# Patient Record
Sex: FEMALE | Race: WHITE | NOT HISPANIC OR LATINO
[De-identification: names, ages, dates, MRNs, and addresses within clinical notes are randomized per-mention and may not be internally consistent; named-entity substitution may affect disease eponyms.]

---

## 2018-04-22 PROBLEM — Z00.00 ENCOUNTER FOR PREVENTIVE HEALTH EXAMINATION: Status: ACTIVE | Noted: 2018-04-22

## 2018-05-22 ENCOUNTER — CLINICAL ADVICE (OUTPATIENT)
Age: 78
End: 2018-05-22

## 2018-05-22 ENCOUNTER — NON-APPOINTMENT (OUTPATIENT)
Age: 78
End: 2018-05-22

## 2018-05-22 ENCOUNTER — APPOINTMENT (OUTPATIENT)
Dept: CARDIOLOGY | Facility: CLINIC | Age: 78
End: 2018-05-22
Payer: COMMERCIAL

## 2018-05-22 VITALS
HEART RATE: 69 BPM | DIASTOLIC BLOOD PRESSURE: 87 MMHG | HEIGHT: 61 IN | SYSTOLIC BLOOD PRESSURE: 165 MMHG | OXYGEN SATURATION: 95 % | WEIGHT: 145 LBS | BODY MASS INDEX: 27.38 KG/M2

## 2018-05-22 DIAGNOSIS — Z82.49 FAMILY HISTORY OF ISCHEMIC HEART DISEASE AND OTHER DISEASES OF THE CIRCULATORY SYSTEM: ICD-10-CM

## 2018-05-22 DIAGNOSIS — E78.00 PURE HYPERCHOLESTEROLEMIA, UNSPECIFIED: ICD-10-CM

## 2018-05-22 DIAGNOSIS — I10 ESSENTIAL (PRIMARY) HYPERTENSION: ICD-10-CM

## 2018-05-22 DIAGNOSIS — F17.200 NICOTINE DEPENDENCE, UNSPECIFIED, UNCOMPLICATED: ICD-10-CM

## 2018-05-22 DIAGNOSIS — Z78.9 OTHER SPECIFIED HEALTH STATUS: ICD-10-CM

## 2018-05-22 PROCEDURE — 99205 OFFICE O/P NEW HI 60 MIN: CPT

## 2018-05-22 PROCEDURE — 93000 ELECTROCARDIOGRAM COMPLETE: CPT

## 2018-05-22 RX ORDER — AMLODIPINE BESYLATE 5 MG/1
5 TABLET ORAL
Refills: 0 | Status: ACTIVE | COMMUNITY

## 2018-05-22 RX ORDER — METFORMIN HYDROCHLORIDE 500 MG/1
500 TABLET, COATED ORAL
Qty: 60 | Refills: 2 | Status: ACTIVE | COMMUNITY

## 2018-05-22 RX ORDER — ATORVASTATIN CALCIUM 20 MG/1
20 TABLET, FILM COATED ORAL
Refills: 0 | Status: ACTIVE | COMMUNITY

## 2018-05-22 RX ORDER — FLUTICASONE PROPIONATE AND SALMETEROL 50; 500 UG/1; UG/1
POWDER RESPIRATORY (INHALATION)
Refills: 0 | Status: ACTIVE | COMMUNITY

## 2018-05-22 RX ORDER — GLIMEPIRIDE 1 MG/1
1 TABLET ORAL
Refills: 0 | Status: ACTIVE | COMMUNITY

## 2018-05-22 RX ORDER — LOSARTAN POTASSIUM 100 MG/1
100 TABLET, FILM COATED ORAL
Refills: 0 | Status: ACTIVE | COMMUNITY

## 2018-06-05 ENCOUNTER — APPOINTMENT (OUTPATIENT)
Dept: CARDIOLOGY | Facility: CLINIC | Age: 78
End: 2018-06-05
Payer: COMMERCIAL

## 2018-06-05 PROCEDURE — 93880 EXTRACRANIAL BILAT STUDY: CPT

## 2018-06-14 ENCOUNTER — APPOINTMENT (OUTPATIENT)
Dept: CARDIOLOGY | Facility: CLINIC | Age: 78
End: 2018-06-14
Payer: COMMERCIAL

## 2018-06-14 PROCEDURE — 93306 TTE W/DOPPLER COMPLETE: CPT

## 2018-06-18 ENCOUNTER — APPOINTMENT (OUTPATIENT)
Dept: CARDIOLOGY | Facility: CLINIC | Age: 78
End: 2018-06-18
Payer: MEDICARE

## 2018-06-18 PROCEDURE — 78452 HT MUSCLE IMAGE SPECT MULT: CPT

## 2018-06-18 PROCEDURE — 93015 CV STRESS TEST SUPVJ I&R: CPT

## 2018-06-18 PROCEDURE — A9500: CPT

## 2021-10-06 PROBLEM — I10 ESSENTIAL HYPERTENSION: Status: ACTIVE | Noted: 2018-05-22

## 2022-02-01 ENCOUNTER — APPOINTMENT (OUTPATIENT)
Dept: OTOLARYNGOLOGY | Facility: CLINIC | Age: 82
End: 2022-02-01
Payer: MEDICARE

## 2022-02-01 VITALS
HEIGHT: 61 IN | BODY MASS INDEX: 25.49 KG/M2 | DIASTOLIC BLOOD PRESSURE: 86 MMHG | HEART RATE: 92 BPM | SYSTOLIC BLOOD PRESSURE: 152 MMHG | TEMPERATURE: 97.3 F | WEIGHT: 135 LBS

## 2022-02-01 DIAGNOSIS — H90.3 SENSORINEURAL HEARING LOSS, BILATERAL: ICD-10-CM

## 2022-02-01 DIAGNOSIS — H61.23 IMPACTED CERUMEN, BILATERAL: ICD-10-CM

## 2022-02-01 PROCEDURE — 99203 OFFICE O/P NEW LOW 30 MIN: CPT | Mod: 25

## 2022-02-01 PROCEDURE — 92567 TYMPANOMETRY: CPT

## 2022-02-01 PROCEDURE — 92557 COMPREHENSIVE HEARING TEST: CPT

## 2022-02-01 NOTE — HISTORY OF PRESENT ILLNESS
[de-identified] : c/o problems with balance.  Problems with legs and fingers are numb.  C/o problem lifting legs.  Did go to neurology.   No hx of chronic ear problems.   No blackout or diplopia.  Does occ feel like falling.

## 2022-02-01 NOTE — ASSESSMENT
[FreeTextEntry1] : Patient with multiple complaints with difficulty with balance , feeling weak, tingling in fingers and clogged ears.  Exam unremarkable except for bilat impacted cerumen .  Patient does have bilat symmetric snhl and discussed HAE but patient not interested.  Feel problem with issues may be ? diabetic neuropathy as patient states she has hard to control diabetes.  Recommended return to endocrinology and neurology.   Follow up as necessary .

## 2022-02-01 NOTE — DATA REVIEWED
[de-identified] : Mild to moderate SNHL 7816-4782 Hz\par Type A tymps\par Pt not interested in HAs [de-identified] : reviewed MRI of 5/21/2021 - neg MRI of head

## 2022-02-01 NOTE — PHYSICAL EXAM
[Midline] : trachea located in midline position [Normal] : no rashes [de-identified] : bilat impacted cerumen  [de-identified] : after cerumen removal

## 2022-02-01 NOTE — REVIEW OF SYSTEMS
[Dizziness] : dizziness [Vertigo] : vertigo [Lightheadedness] : lightheadedness [Ear Drainage] : ear drainage [Negative] : Heme/Lymph [de-identified] : occasional ringing in the ear, clogged ears

## 2022-04-11 ENCOUNTER — NON-APPOINTMENT (OUTPATIENT)
Age: 82
End: 2022-04-11

## 2022-04-11 ENCOUNTER — APPOINTMENT (OUTPATIENT)
Dept: ORTHOPEDIC SURGERY | Facility: CLINIC | Age: 82
End: 2022-04-11
Payer: MEDICARE

## 2022-04-11 VITALS
HEIGHT: 61 IN | HEART RATE: 82 BPM | SYSTOLIC BLOOD PRESSURE: 147 MMHG | DIASTOLIC BLOOD PRESSURE: 74 MMHG | WEIGHT: 138 LBS | BODY MASS INDEX: 26.06 KG/M2

## 2022-04-11 DIAGNOSIS — Z86.79 PERSONAL HISTORY OF OTHER DISEASES OF THE CIRCULATORY SYSTEM: ICD-10-CM

## 2022-04-11 DIAGNOSIS — Z87.09 PERSONAL HISTORY OF OTHER DISEASES OF THE RESPIRATORY SYSTEM: ICD-10-CM

## 2022-04-11 DIAGNOSIS — Z87.39 PERSONAL HISTORY OF OTHER DISEASES OF THE MUSCULOSKELETAL SYSTEM AND CONNECTIVE TISSUE: ICD-10-CM

## 2022-04-11 DIAGNOSIS — E11.9 TYPE 2 DIABETES MELLITUS W/OUT COMPLICATIONS: ICD-10-CM

## 2022-04-11 PROCEDURE — 99204 OFFICE O/P NEW MOD 45 MIN: CPT

## 2022-04-11 RX ORDER — MECLIZINE HYDROCHLORIDE 12.5 MG/1
12.5 TABLET ORAL
Qty: 90 | Refills: 0 | Status: ACTIVE | COMMUNITY
Start: 2022-01-27

## 2022-04-11 RX ORDER — OMEPRAZOLE 20 MG/1
20 CAPSULE, DELAYED RELEASE ORAL
Qty: 90 | Refills: 0 | Status: ACTIVE | COMMUNITY
Start: 2021-08-12

## 2022-04-11 RX ORDER — GLIMEPIRIDE 4 MG/1
4 TABLET ORAL
Qty: 90 | Refills: 0 | Status: ACTIVE | COMMUNITY
Start: 2022-02-25

## 2022-04-25 ENCOUNTER — APPOINTMENT (OUTPATIENT)
Dept: ORTHOPEDIC SURGERY | Facility: CLINIC | Age: 82
End: 2022-04-25
Payer: MEDICARE

## 2022-04-25 VITALS
SYSTOLIC BLOOD PRESSURE: 162 MMHG | HEART RATE: 62 BPM | WEIGHT: 137.3 LBS | HEIGHT: 61 IN | DIASTOLIC BLOOD PRESSURE: 72 MMHG | BODY MASS INDEX: 25.92 KG/M2

## 2022-04-25 DIAGNOSIS — M48.02 SPINAL STENOSIS, CERVICAL REGION: ICD-10-CM

## 2022-04-25 PROCEDURE — 99214 OFFICE O/P EST MOD 30 MIN: CPT

## 2022-04-26 ENCOUNTER — NON-APPOINTMENT (OUTPATIENT)
Age: 82
End: 2022-04-26

## 2022-05-18 ENCOUNTER — OUTPATIENT (OUTPATIENT)
Dept: OUTPATIENT SERVICES | Facility: HOSPITAL | Age: 82
LOS: 1 days | End: 2022-05-18
Payer: MEDICARE

## 2022-05-18 VITALS
HEART RATE: 66 BPM | DIASTOLIC BLOOD PRESSURE: 64 MMHG | OXYGEN SATURATION: 98 % | TEMPERATURE: 98 F | RESPIRATION RATE: 16 BRPM | SYSTOLIC BLOOD PRESSURE: 161 MMHG | HEIGHT: 60 IN | WEIGHT: 138.01 LBS

## 2022-05-18 DIAGNOSIS — M48.02 SPINAL STENOSIS, CERVICAL REGION: ICD-10-CM

## 2022-05-18 DIAGNOSIS — Z98.890 OTHER SPECIFIED POSTPROCEDURAL STATES: Chronic | ICD-10-CM

## 2022-05-18 DIAGNOSIS — G95.9 DISEASE OF SPINAL CORD, UNSPECIFIED: ICD-10-CM

## 2022-05-18 DIAGNOSIS — M54.12 RADICULOPATHY, CERVICAL REGION: ICD-10-CM

## 2022-05-18 DIAGNOSIS — Z01.818 ENCOUNTER FOR OTHER PREPROCEDURAL EXAMINATION: ICD-10-CM

## 2022-05-18 LAB
A1C WITH ESTIMATED AVERAGE GLUCOSE RESULT: 8.4 % — HIGH (ref 4–5.6)
ALBUMIN SERPL ELPH-MCNC: 4.2 G/DL — SIGNIFICANT CHANGE UP (ref 3.3–5)
ALP SERPL-CCNC: 75 U/L — SIGNIFICANT CHANGE UP (ref 30–120)
ALT FLD-CCNC: 21 U/L DA — SIGNIFICANT CHANGE UP (ref 10–60)
ANION GAP SERPL CALC-SCNC: 9 MMOL/L — SIGNIFICANT CHANGE UP (ref 5–17)
APTT BLD: 30.7 SEC — SIGNIFICANT CHANGE UP (ref 27.5–35.5)
AST SERPL-CCNC: 19 U/L — SIGNIFICANT CHANGE UP (ref 10–40)
BILIRUB SERPL-MCNC: 0.5 MG/DL — SIGNIFICANT CHANGE UP (ref 0.2–1.2)
BLD GP AB SCN SERPL QL: SIGNIFICANT CHANGE UP
BUN SERPL-MCNC: 17 MG/DL — SIGNIFICANT CHANGE UP (ref 7–23)
CALCIUM SERPL-MCNC: 9.7 MG/DL — SIGNIFICANT CHANGE UP (ref 8.4–10.5)
CHLORIDE SERPL-SCNC: 101 MMOL/L — SIGNIFICANT CHANGE UP (ref 96–108)
CO2 SERPL-SCNC: 30 MMOL/L — SIGNIFICANT CHANGE UP (ref 22–31)
CREAT SERPL-MCNC: 0.91 MG/DL — SIGNIFICANT CHANGE UP (ref 0.5–1.3)
EGFR: 64 ML/MIN/1.73M2 — SIGNIFICANT CHANGE UP
ESTIMATED AVERAGE GLUCOSE: 194 MG/DL — HIGH (ref 68–114)
GLUCOSE SERPL-MCNC: 209 MG/DL — HIGH (ref 70–99)
HCT VFR BLD CALC: 41.7 % — SIGNIFICANT CHANGE UP (ref 34.5–45)
HGB BLD-MCNC: 13.6 G/DL — SIGNIFICANT CHANGE UP (ref 11.5–15.5)
INR BLD: 0.99 RATIO — SIGNIFICANT CHANGE UP (ref 0.88–1.16)
MCHC RBC-ENTMCNC: 30.2 PG — SIGNIFICANT CHANGE UP (ref 27–34)
MCHC RBC-ENTMCNC: 32.6 GM/DL — SIGNIFICANT CHANGE UP (ref 32–36)
MCV RBC AUTO: 92.7 FL — SIGNIFICANT CHANGE UP (ref 80–100)
NRBC # BLD: 0 /100 WBCS — SIGNIFICANT CHANGE UP (ref 0–0)
PLATELET # BLD AUTO: 250 K/UL — SIGNIFICANT CHANGE UP (ref 150–400)
POTASSIUM SERPL-MCNC: 4.7 MMOL/L — SIGNIFICANT CHANGE UP (ref 3.5–5.3)
POTASSIUM SERPL-SCNC: 4.7 MMOL/L — SIGNIFICANT CHANGE UP (ref 3.5–5.3)
PROT SERPL-MCNC: 7.6 G/DL — SIGNIFICANT CHANGE UP (ref 6–8.3)
PROTHROM AB SERPL-ACNC: 11.7 SEC — SIGNIFICANT CHANGE UP (ref 10.5–13.4)
RBC # BLD: 4.5 M/UL — SIGNIFICANT CHANGE UP (ref 3.8–5.2)
RBC # FLD: 13.1 % — SIGNIFICANT CHANGE UP (ref 10.3–14.5)
SODIUM SERPL-SCNC: 140 MMOL/L — SIGNIFICANT CHANGE UP (ref 135–145)
WBC # BLD: 7.05 K/UL — SIGNIFICANT CHANGE UP (ref 3.8–10.5)
WBC # FLD AUTO: 7.05 K/UL — SIGNIFICANT CHANGE UP (ref 3.8–10.5)

## 2022-05-18 PROCEDURE — 93005 ELECTROCARDIOGRAM TRACING: CPT

## 2022-05-18 PROCEDURE — G0463: CPT

## 2022-05-18 PROCEDURE — 93010 ELECTROCARDIOGRAM REPORT: CPT

## 2022-05-18 RX ORDER — OMEPRAZOLE 10 MG/1
0 CAPSULE, DELAYED RELEASE ORAL
Qty: 0 | Refills: 1 | DISCHARGE

## 2022-05-18 RX ORDER — ATORVASTATIN CALCIUM 80 MG/1
0 TABLET, FILM COATED ORAL
Qty: 0 | Refills: 2 | DISCHARGE

## 2022-05-18 RX ORDER — DONEPEZIL HYDROCHLORIDE 10 MG/1
0 TABLET, FILM COATED ORAL
Qty: 0 | Refills: 0 | DISCHARGE

## 2022-05-18 RX ORDER — LOSARTAN POTASSIUM 100 MG/1
0 TABLET, FILM COATED ORAL
Qty: 0 | Refills: 5 | DISCHARGE

## 2022-05-18 RX ORDER — GLIMEPIRIDE 1 MG
0 TABLET ORAL
Qty: 0 | Refills: 0 | DISCHARGE

## 2022-05-18 RX ORDER — RIVASTIGMINE 4.6 MG/24H
0 PATCH, EXTENDED RELEASE TRANSDERMAL
Qty: 0 | Refills: 0 | DISCHARGE

## 2022-05-18 RX ORDER — AMLODIPINE BESYLATE 2.5 MG/1
0 TABLET ORAL
Qty: 0 | Refills: 2 | DISCHARGE

## 2022-05-18 RX ORDER — METFORMIN HYDROCHLORIDE 850 MG/1
0 TABLET ORAL
Qty: 0 | Refills: 3 | DISCHARGE

## 2022-05-18 NOTE — H&P PST ADULT - NSICDXPASTSURGICALHX_GEN_ALL_CORE_FT
PAST SURGICAL HISTORY:  History of bladder surgery     S/P carpal tunnel release right    
Detail Level: Detailed
Plan: Pt to see OBGYN if becomes sexually active.
Continue Regimen: Rekha QD\\nGentle face wash BID\\nmoisturizer with sunscreen
Initiate Treatment: Tretinoin 0.025% to face QOHS x 1wk then increase to nightly as tolerated

## 2022-05-18 NOTE — H&P PST ADULT - ATTENDING COMMENTS
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  She is also a 1/2 pack/day smoker and a poorly controlled diabetic.  The need for strict control of those two disease processes is paramount to a good post surgical outcome.  Her blood sugar today is less than 170.  Will proceed with this needed surgery but the importance of compliance could not be underscored more during my preop talk in the holding area prior to her neck surgery.

## 2022-05-18 NOTE — H&P PST ADULT - ASSESSMENT
81 y/o female with lower back pain  Planned surgery.- posterior decompressive laminectomy  Will obtain medical clearance  covid testing 6/5/22-8-2pm  Pre op instructions provided  Instructions provided on medications to continue and to take the day morning of surgery

## 2022-05-18 NOTE — H&P PST ADULT - NSANTHOSAYNRD_GEN_A_CORE
No. RUTH ANN screening performed.  STOP BANG Legend: 0-2 = LOW Risk; 3-4 = INTERMEDIATE Risk; 5-8 = HIGH Risk

## 2022-05-18 NOTE — H&P PST ADULT - NSICDXPASTMEDICALHX_GEN_ALL_CORE_FT
PAST MEDICAL HISTORY:  Dementia     H/O spinal stenosis     HLD (hyperlipidemia)     Hypertension     Type 2 diabetes mellitus

## 2022-05-18 NOTE — H&P PST ADULT - HISTORY OF PRESENT ILLNESS
82 y/o female with h/o  80 y/o female with h/o lower back pain for last 5 months. Decreased ROM and pain with flexion. Denies any injury. Not taking any pain meds. MRI done and was advised surgery. Seen in PAT in NAD

## 2022-05-18 NOTE — H&P PST ADULT - NSICDXFAMILYHX_GEN_ALL_CORE_FT
FAMILY HISTORY:  Child  Still living? No  Family history of heart attack, Age at diagnosis: Age Unknown

## 2022-05-31 RX ORDER — TRANEXAMIC ACID 100 MG/ML
938 INJECTION, SOLUTION INTRAVENOUS ONCE
Refills: 0 | Status: DISCONTINUED | OUTPATIENT
Start: 2022-06-07 | End: 2022-06-08

## 2022-06-01 NOTE — PROVIDER CONTACT NOTE (OTHER) - ASSESSMENT
The Spine Pre-Operative Education packet was given to the patient on 4/26/22. The patient and NP reviewed the information included in the packet. All her questions were answered and she gave a clear understanding of the instructions. She was advised to call the office at any time with further questions or concerns.

## 2022-06-05 ENCOUNTER — OUTPATIENT (OUTPATIENT)
Dept: OUTPATIENT SERVICES | Facility: HOSPITAL | Age: 82
LOS: 1 days | End: 2022-06-05
Payer: MEDICARE

## 2022-06-05 DIAGNOSIS — Z98.890 OTHER SPECIFIED POSTPROCEDURAL STATES: Chronic | ICD-10-CM

## 2022-06-05 DIAGNOSIS — Z20.828 CONTACT WITH AND (SUSPECTED) EXPOSURE TO OTHER VIRAL COMMUNICABLE DISEASES: ICD-10-CM

## 2022-06-05 LAB — SARS-COV-2 RNA SPEC QL NAA+PROBE: SIGNIFICANT CHANGE UP

## 2022-06-05 PROCEDURE — U0005: CPT

## 2022-06-05 PROCEDURE — U0003: CPT

## 2022-06-06 ENCOUNTER — TRANSCRIPTION ENCOUNTER (OUTPATIENT)
Age: 82
End: 2022-06-06

## 2022-06-07 ENCOUNTER — RESULT REVIEW (OUTPATIENT)
Age: 82
End: 2022-06-07

## 2022-06-07 ENCOUNTER — INPATIENT (INPATIENT)
Facility: HOSPITAL | Age: 82
LOS: 0 days | Discharge: HOME | DRG: 472 | End: 2022-06-08
Attending: ORTHOPAEDIC SURGERY | Admitting: ORTHOPAEDIC SURGERY
Payer: MEDICARE

## 2022-06-07 ENCOUNTER — TRANSCRIPTION ENCOUNTER (OUTPATIENT)
Age: 82
End: 2022-06-07

## 2022-06-07 ENCOUNTER — APPOINTMENT (OUTPATIENT)
Dept: ORTHOPEDIC SURGERY | Facility: HOSPITAL | Age: 82
End: 2022-06-07

## 2022-06-07 VITALS
HEART RATE: 74 BPM | HEIGHT: 55 IN | WEIGHT: 136.69 LBS | SYSTOLIC BLOOD PRESSURE: 153 MMHG | DIASTOLIC BLOOD PRESSURE: 65 MMHG | RESPIRATION RATE: 24 BRPM | OXYGEN SATURATION: 98 % | TEMPERATURE: 98 F

## 2022-06-07 DIAGNOSIS — Z98.890 OTHER SPECIFIED POSTPROCEDURAL STATES: Chronic | ICD-10-CM

## 2022-06-07 DIAGNOSIS — G95.9 DISEASE OF SPINAL CORD, UNSPECIFIED: ICD-10-CM

## 2022-06-07 DIAGNOSIS — M48.02 SPINAL STENOSIS, CERVICAL REGION: ICD-10-CM

## 2022-06-07 PROBLEM — I10 ESSENTIAL (PRIMARY) HYPERTENSION: Chronic | Status: ACTIVE | Noted: 2022-05-18

## 2022-06-07 PROBLEM — E78.5 HYPERLIPIDEMIA, UNSPECIFIED: Chronic | Status: ACTIVE | Noted: 2022-05-18

## 2022-06-07 PROBLEM — Z87.39 PERSONAL HISTORY OF OTHER DISEASES OF THE MUSCULOSKELETAL SYSTEM AND CONNECTIVE TISSUE: Chronic | Status: ACTIVE | Noted: 2022-05-18

## 2022-06-07 PROBLEM — E11.9 TYPE 2 DIABETES MELLITUS WITHOUT COMPLICATIONS: Chronic | Status: ACTIVE | Noted: 2022-05-18

## 2022-06-07 PROBLEM — F03.90 UNSPECIFIED DEMENTIA WITHOUT BEHAVIORAL DISTURBANCE: Chronic | Status: ACTIVE | Noted: 2022-05-18

## 2022-06-07 LAB
ABO RH CONFIRMATION: SIGNIFICANT CHANGE UP
GLUCOSE BLDC GLUCOMTR-MCNC: 162 MG/DL — HIGH (ref 70–99)
GLUCOSE BLDC GLUCOMTR-MCNC: 210 MG/DL — HIGH (ref 70–99)
GLUCOSE BLDC GLUCOMTR-MCNC: 252 MG/DL — HIGH (ref 70–99)

## 2022-06-07 PROCEDURE — 63048 LAM FACETEC &FORAMOT EA ADDL: CPT | Mod: 82

## 2022-06-07 PROCEDURE — 63045 LAM FACETEC & FORAMOT CRV: CPT | Mod: 82

## 2022-06-07 PROCEDURE — 22842 INSERT SPINE FIXATION DEVICE: CPT | Mod: 82

## 2022-06-07 PROCEDURE — 88304 TISSUE EXAM BY PATHOLOGIST: CPT | Mod: 26

## 2022-06-07 PROCEDURE — 63048 LAM FACETEC &FORAMOT EA ADDL: CPT

## 2022-06-07 PROCEDURE — 22842 INSERT SPINE FIXATION DEVICE: CPT

## 2022-06-07 PROCEDURE — 88311 DECALCIFY TISSUE: CPT | Mod: 26

## 2022-06-07 PROCEDURE — 22614 ARTHRD PST TQ 1NTRSPC EA ADD: CPT

## 2022-06-07 PROCEDURE — 22600 ARTHRD PST TQ 1NTRSPC CRV: CPT

## 2022-06-07 PROCEDURE — 63045 LAM FACETEC & FORAMOT CRV: CPT

## 2022-06-07 PROCEDURE — 22614 ARTHRD PST TQ 1NTRSPC EA ADD: CPT | Mod: 82

## 2022-06-07 PROCEDURE — 22600 ARTHRD PST TQ 1NTRSPC CRV: CPT | Mod: 82

## 2022-06-07 PROCEDURE — 99223 1ST HOSP IP/OBS HIGH 75: CPT

## 2022-06-07 DEVICE — SEALANT VISTASEAL FIBRIN HUMAN 4ML: Type: IMPLANTABLE DEVICE | Status: FUNCTIONAL

## 2022-06-07 DEVICE — IMPLANTABLE DEVICE: Type: IMPLANTABLE DEVICE | Status: FUNCTIONAL

## 2022-06-07 DEVICE — BONE WAX 2.5GM: Type: IMPLANTABLE DEVICE | Status: FUNCTIONAL

## 2022-06-07 DEVICE — ALLOGRAFT INTEGRO DBM PLUS 10CC STRL: Type: IMPLANTABLE DEVICE | Status: FUNCTIONAL

## 2022-06-07 DEVICE — KIT SURGIFLO HEMOSTATIC MATRIX: Type: IMPLANTABLE DEVICE | Status: FUNCTIONAL

## 2022-06-07 DEVICE — SURGIFOAM PAD SZ 100: Type: IMPLANTABLE DEVICE | Status: FUNCTIONAL

## 2022-06-07 RX ORDER — DEXTROSE 50 % IN WATER 50 %
15 SYRINGE (ML) INTRAVENOUS ONCE
Refills: 0 | Status: DISCONTINUED | OUTPATIENT
Start: 2022-06-07 | End: 2022-06-08

## 2022-06-07 RX ORDER — INSULIN LISPRO 100/ML
VIAL (ML) SUBCUTANEOUS
Refills: 0 | Status: DISCONTINUED | OUTPATIENT
Start: 2022-06-07 | End: 2022-06-08

## 2022-06-07 RX ORDER — HYDROMORPHONE HYDROCHLORIDE 2 MG/ML
0.5 INJECTION INTRAMUSCULAR; INTRAVENOUS; SUBCUTANEOUS
Refills: 0 | Status: DISCONTINUED | OUTPATIENT
Start: 2022-06-07 | End: 2022-06-07

## 2022-06-07 RX ORDER — ACETAMINOPHEN 500 MG
1000 TABLET ORAL EVERY 8 HOURS
Refills: 0 | Status: DISCONTINUED | OUTPATIENT
Start: 2022-06-08 | End: 2022-06-08

## 2022-06-07 RX ORDER — DEXTROSE 50 % IN WATER 50 %
25 SYRINGE (ML) INTRAVENOUS ONCE
Refills: 0 | Status: DISCONTINUED | OUTPATIENT
Start: 2022-06-07 | End: 2022-06-08

## 2022-06-07 RX ORDER — PANTOPRAZOLE SODIUM 20 MG/1
40 TABLET, DELAYED RELEASE ORAL
Refills: 0 | Status: DISCONTINUED | OUTPATIENT
Start: 2022-06-07 | End: 2022-06-08

## 2022-06-07 RX ORDER — HYDROMORPHONE HYDROCHLORIDE 2 MG/ML
2 INJECTION INTRAMUSCULAR; INTRAVENOUS; SUBCUTANEOUS
Refills: 0 | Status: DISCONTINUED | OUTPATIENT
Start: 2022-06-07 | End: 2022-06-08

## 2022-06-07 RX ORDER — SODIUM CHLORIDE 9 MG/ML
1000 INJECTION, SOLUTION INTRAVENOUS
Refills: 0 | Status: DISCONTINUED | OUTPATIENT
Start: 2022-06-07 | End: 2022-06-07

## 2022-06-07 RX ORDER — ATORVASTATIN CALCIUM 80 MG/1
20 TABLET, FILM COATED ORAL AT BEDTIME
Refills: 0 | Status: DISCONTINUED | OUTPATIENT
Start: 2022-06-07 | End: 2022-06-08

## 2022-06-07 RX ORDER — HYDROMORPHONE HYDROCHLORIDE 2 MG/ML
4 INJECTION INTRAMUSCULAR; INTRAVENOUS; SUBCUTANEOUS
Refills: 0 | Status: DISCONTINUED | OUTPATIENT
Start: 2022-06-07 | End: 2022-06-08

## 2022-06-07 RX ORDER — CEFAZOLIN SODIUM 1 G
2000 VIAL (EA) INJECTION ONCE
Refills: 0 | Status: COMPLETED | OUTPATIENT
Start: 2022-06-07 | End: 2022-06-07

## 2022-06-07 RX ORDER — METFORMIN HYDROCHLORIDE 850 MG/1
500 TABLET ORAL
Refills: 0 | Status: DISCONTINUED | OUTPATIENT
Start: 2022-06-08 | End: 2022-06-08

## 2022-06-07 RX ORDER — SODIUM CHLORIDE 9 MG/ML
1000 INJECTION, SOLUTION INTRAVENOUS
Refills: 0 | Status: DISCONTINUED | OUTPATIENT
Start: 2022-06-07 | End: 2022-06-08

## 2022-06-07 RX ORDER — ACETAMINOPHEN 500 MG
1000 TABLET ORAL ONCE
Refills: 0 | Status: COMPLETED | OUTPATIENT
Start: 2022-06-07 | End: 2022-06-07

## 2022-06-07 RX ORDER — SENNA PLUS 8.6 MG/1
2 TABLET ORAL AT BEDTIME
Refills: 0 | Status: DISCONTINUED | OUTPATIENT
Start: 2022-06-07 | End: 2022-06-08

## 2022-06-07 RX ORDER — GLUCAGON INJECTION, SOLUTION 0.5 MG/.1ML
1 INJECTION, SOLUTION SUBCUTANEOUS ONCE
Refills: 0 | Status: DISCONTINUED | OUTPATIENT
Start: 2022-06-07 | End: 2022-06-08

## 2022-06-07 RX ORDER — LOSARTAN POTASSIUM 100 MG/1
100 TABLET, FILM COATED ORAL DAILY
Refills: 0 | Status: DISCONTINUED | OUTPATIENT
Start: 2022-06-09 | End: 2022-06-08

## 2022-06-07 RX ORDER — DONEPEZIL HYDROCHLORIDE 10 MG/1
5 TABLET, FILM COATED ORAL AT BEDTIME
Refills: 0 | Status: DISCONTINUED | OUTPATIENT
Start: 2022-06-07 | End: 2022-06-08

## 2022-06-07 RX ORDER — HYDROMORPHONE HYDROCHLORIDE 2 MG/ML
0.5 INJECTION INTRAMUSCULAR; INTRAVENOUS; SUBCUTANEOUS
Refills: 0 | Status: DISCONTINUED | OUTPATIENT
Start: 2022-06-07 | End: 2022-06-08

## 2022-06-07 RX ORDER — APREPITANT 80 MG/1
40 CAPSULE ORAL ONCE
Refills: 0 | Status: COMPLETED | OUTPATIENT
Start: 2022-06-07 | End: 2022-06-07

## 2022-06-07 RX ORDER — DEXTROSE 50 % IN WATER 50 %
12.5 SYRINGE (ML) INTRAVENOUS ONCE
Refills: 0 | Status: DISCONTINUED | OUTPATIENT
Start: 2022-06-07 | End: 2022-06-08

## 2022-06-07 RX ORDER — AMLODIPINE BESYLATE 2.5 MG/1
5 TABLET ORAL DAILY
Refills: 0 | Status: DISCONTINUED | OUTPATIENT
Start: 2022-06-09 | End: 2022-06-08

## 2022-06-07 RX ORDER — ONDANSETRON 8 MG/1
4 TABLET, FILM COATED ORAL ONCE
Refills: 0 | Status: COMPLETED | OUTPATIENT
Start: 2022-06-07 | End: 2022-06-07

## 2022-06-07 RX ORDER — CEFAZOLIN SODIUM 1 G
2000 VIAL (EA) INJECTION EVERY 8 HOURS
Refills: 0 | Status: COMPLETED | OUTPATIENT
Start: 2022-06-07 | End: 2022-06-08

## 2022-06-07 RX ORDER — POLYETHYLENE GLYCOL 3350 17 G/17G
17 POWDER, FOR SOLUTION ORAL AT BEDTIME
Refills: 0 | Status: DISCONTINUED | OUTPATIENT
Start: 2022-06-07 | End: 2022-06-08

## 2022-06-07 RX ORDER — ONDANSETRON 8 MG/1
4 TABLET, FILM COATED ORAL EVERY 6 HOURS
Refills: 0 | Status: DISCONTINUED | OUTPATIENT
Start: 2022-06-07 | End: 2022-06-08

## 2022-06-07 RX ADMIN — Medication 100 MILLIGRAM(S): at 21:00

## 2022-06-07 RX ADMIN — SENNA PLUS 2 TABLET(S): 8.6 TABLET ORAL at 21:26

## 2022-06-07 RX ADMIN — HYDROMORPHONE HYDROCHLORIDE 2 MILLIGRAM(S): 2 INJECTION INTRAMUSCULAR; INTRAVENOUS; SUBCUTANEOUS at 21:15

## 2022-06-07 RX ADMIN — HYDROMORPHONE HYDROCHLORIDE 2 MILLIGRAM(S): 2 INJECTION INTRAMUSCULAR; INTRAVENOUS; SUBCUTANEOUS at 21:45

## 2022-06-07 RX ADMIN — HYDROMORPHONE HYDROCHLORIDE 0.5 MILLIGRAM(S): 2 INJECTION INTRAMUSCULAR; INTRAVENOUS; SUBCUTANEOUS at 19:26

## 2022-06-07 RX ADMIN — APREPITANT 40 MILLIGRAM(S): 80 CAPSULE ORAL at 11:07

## 2022-06-07 RX ADMIN — Medication 1000 MILLIGRAM(S): at 22:07

## 2022-06-07 RX ADMIN — Medication 3: at 21:56

## 2022-06-07 RX ADMIN — ATORVASTATIN CALCIUM 20 MILLIGRAM(S): 80 TABLET, FILM COATED ORAL at 21:26

## 2022-06-07 RX ADMIN — HYDROMORPHONE HYDROCHLORIDE 0.5 MILLIGRAM(S): 2 INJECTION INTRAMUSCULAR; INTRAVENOUS; SUBCUTANEOUS at 18:52

## 2022-06-07 RX ADMIN — DONEPEZIL HYDROCHLORIDE 5 MILLIGRAM(S): 10 TABLET, FILM COATED ORAL at 21:26

## 2022-06-07 RX ADMIN — SODIUM CHLORIDE 75 MILLILITER(S): 9 INJECTION, SOLUTION INTRAVENOUS at 18:52

## 2022-06-07 RX ADMIN — ONDANSETRON 4 MILLIGRAM(S): 8 TABLET, FILM COATED ORAL at 19:08

## 2022-06-07 RX ADMIN — Medication 400 MILLIGRAM(S): at 21:55

## 2022-06-07 NOTE — BRIEF OPERATIVE NOTE - NSICDXBRIEFPOSTOP_GEN_ALL_CORE_FT
POST-OP DIAGNOSIS:  Myelopathy concurrent with and due to spinal stenosis of cervical region 07-Jun-2022 17:10:42  Jorje Bolton

## 2022-06-07 NOTE — BRIEF OPERATIVE NOTE - NSICDXBRIEFPREOP_GEN_ALL_CORE_FT
PRE-OP DIAGNOSIS:  Myelopathy concurrent with and due to spinal stenosis of cervical region 07-Jun-2022 17:10:25  Jorje Bolton

## 2022-06-07 NOTE — PHYSICAL THERAPY INITIAL EVALUATION ADULT - PERTINENT HX OF CURRENT PROBLEM, REHAB EVAL
82 y/o female with h/o lower back pain for last 5 months. Decreased ROM and pain with flexion. Denies any injury. Not taking any pain meds. MRI done and was advised surgery. Seen in PAT in NAD

## 2022-06-07 NOTE — PHYSICAL THERAPY INITIAL EVALUATION ADULT - IMPAIRED TRANSFERS: SIT/STAND, REHAB EVAL
impaired balance/impaired coordination/narrow base of support/pain/impaired postural control/decreased ROM/decreased sensation/decreased strength

## 2022-06-07 NOTE — PHYSICAL THERAPY INITIAL EVALUATION ADULT - BALANCE DISTURBANCE, IDENTIFIED IMPAIRMENT CONTRIBUTE, REHAB EVAL
impaired coordination/impaired motor control/pain/decreased ROM/decreased sensation/decreased strength

## 2022-06-07 NOTE — BRIEF OPERATIVE NOTE - NSICDXBRIEFPROCEDURE_GEN_ALL_CORE_FT
PROCEDURES:  Laminectomy, spine, cervical, posterior approach, with fusion using instrumentation 07-Jun-2022 17:09:47 C3-6 Jorje Bolton

## 2022-06-07 NOTE — PHYSICAL THERAPY INITIAL EVALUATION ADULT - IMPAIRMENTS CONTRIBUTING TO GAIT DEVIATIONS, PT EVAL
impaired balance/narrow base of support/pain/impaired postural control/decreased ROM/decreased sensation/decreased strength

## 2022-06-07 NOTE — PATIENT PROFILE ADULT - FALL HARM RISK - UNIVERSAL INTERVENTIONS
Bed in lowest position, wheels locked, appropriate side rails in place/Call bell, personal items and telephone in reach/Instruct patient to call for assistance before getting out of bed or chair/Non-slip footwear when patient is out of bed/[place] to call system/Physically safe environment - no spills, clutter or unnecessary equipment/Purposeful Proactive Rounding/Room/bathroom lighting operational, light cord in reach

## 2022-06-07 NOTE — PATIENT PROFILE ADULT - ARE SIGNIFICANT INDICATORS COMPLETE.
INSURANCE COVERAGE REGARDING PAYMENT  FOR YOUR COLONOSCOPY        Colon Cancer is the second leading cause of death among cancers, per the American Cancer Society. It is preventable. Early detection is the key. Your doctor will determine which tests need to be done for prevention and/or treatment. However, colonoscopies can be performed for several reasons:     Screening To screen for any problems within the colon. In this case, the patient is not symptomatic and does not have a personal history of previous colon cancer/condition or abnormal findings. Billed as screening.   Surveillance To monitor for a previously diagnosed colon condition (such as polyps) or when performed at more frequent intervals than every ten years because the patient has a personal/family history of colonic polyps or colon cancer. Billed as diagnostic.   Diagnostic Patient with symptoms, used to evaluate the colon. Billed as diagnostic.       If during a screening colonoscopy, our physician finds an abnormality, performs a biopsy or polypectomy (removal of polyp), your insurance company may consider the procedure to be a diagnostic exam and no longer a screening procedure.     Every insurance company is different. We encourage you to call your insurance company regarding plan benefits. Generally, screening benefits and diagnostic benefits may be paid at different levels. Charges associated with anesthesia or type of facility may also be processed differently. This varies with each insurance company, so we want you to be aware of this prior to your procedure. You do not have to call your insurance company if you have Medicare. For an estimate of potential charges, you may call the [place] Patient Contact Center at [phone], option 5.     The authorization staff at [facility] will contact your insurance company to check precertification requirements for your colonoscopy. However, precertification, which serves as notification  is never a guarantee of payment. If you have questions regarding precertification for your procedure, please contact your insurance company. If you need further assistance call our authorization department at [phone].   Yes

## 2022-06-07 NOTE — PHYSICAL THERAPY INITIAL EVALUATION ADULT - IMPAIRMENTS CONTRIBUTING IMPAIRED BED MOBILITY, REHAB EVAL
impaired balance/cognition/impaired coordination/narrow base of support/pain/impaired postural control/decreased ROM/decreased sensation/decreased strength

## 2022-06-08 ENCOUNTER — TRANSCRIPTION ENCOUNTER (OUTPATIENT)
Age: 82
End: 2022-06-08

## 2022-06-08 VITALS
RESPIRATION RATE: 18 BRPM | OXYGEN SATURATION: 96 % | SYSTOLIC BLOOD PRESSURE: 150 MMHG | TEMPERATURE: 98 F | DIASTOLIC BLOOD PRESSURE: 69 MMHG | HEART RATE: 68 BPM

## 2022-06-08 DIAGNOSIS — E11.9 TYPE 2 DIABETES MELLITUS WITHOUT COMPLICATIONS: ICD-10-CM

## 2022-06-08 LAB
ANION GAP SERPL CALC-SCNC: 8 MMOL/L — SIGNIFICANT CHANGE UP (ref 5–17)
BASOPHILS # BLD AUTO: 0.02 K/UL — SIGNIFICANT CHANGE UP (ref 0–0.2)
BASOPHILS NFR BLD AUTO: 0.2 % — SIGNIFICANT CHANGE UP (ref 0–2)
BUN SERPL-MCNC: 19 MG/DL — SIGNIFICANT CHANGE UP (ref 7–23)
CALCIUM SERPL-MCNC: 9.1 MG/DL — SIGNIFICANT CHANGE UP (ref 8.4–10.5)
CHLORIDE SERPL-SCNC: 99 MMOL/L — SIGNIFICANT CHANGE UP (ref 96–108)
CO2 SERPL-SCNC: 28 MMOL/L — SIGNIFICANT CHANGE UP (ref 22–31)
CREAT SERPL-MCNC: 0.78 MG/DL — SIGNIFICANT CHANGE UP (ref 0.5–1.3)
EGFR: 77 ML/MIN/1.73M2 — SIGNIFICANT CHANGE UP
EOSINOPHIL # BLD AUTO: 0 K/UL — SIGNIFICANT CHANGE UP (ref 0–0.5)
EOSINOPHIL NFR BLD AUTO: 0 % — SIGNIFICANT CHANGE UP (ref 0–6)
GLUCOSE BLDC GLUCOMTR-MCNC: 249 MG/DL — HIGH (ref 70–99)
GLUCOSE BLDC GLUCOMTR-MCNC: 284 MG/DL — HIGH (ref 70–99)
GLUCOSE SERPL-MCNC: 263 MG/DL — HIGH (ref 70–99)
HCT VFR BLD CALC: 38.7 % — SIGNIFICANT CHANGE UP (ref 34.5–45)
HGB BLD-MCNC: 12.8 G/DL — SIGNIFICANT CHANGE UP (ref 11.5–15.5)
IMM GRANULOCYTES NFR BLD AUTO: 0.3 % — SIGNIFICANT CHANGE UP (ref 0–1.5)
LYMPHOCYTES # BLD AUTO: 1.52 K/UL — SIGNIFICANT CHANGE UP (ref 1–3.3)
LYMPHOCYTES # BLD AUTO: 15.1 % — SIGNIFICANT CHANGE UP (ref 13–44)
MCHC RBC-ENTMCNC: 30 PG — SIGNIFICANT CHANGE UP (ref 27–34)
MCHC RBC-ENTMCNC: 33.1 GM/DL — SIGNIFICANT CHANGE UP (ref 32–36)
MCV RBC AUTO: 90.8 FL — SIGNIFICANT CHANGE UP (ref 80–100)
MONOCYTES # BLD AUTO: 0.44 K/UL — SIGNIFICANT CHANGE UP (ref 0–0.9)
MONOCYTES NFR BLD AUTO: 4.4 % — SIGNIFICANT CHANGE UP (ref 2–14)
NEUTROPHILS # BLD AUTO: 8.06 K/UL — HIGH (ref 1.8–7.4)
NEUTROPHILS NFR BLD AUTO: 80 % — HIGH (ref 43–77)
NRBC # BLD: 0 /100 WBCS — SIGNIFICANT CHANGE UP (ref 0–0)
PLATELET # BLD AUTO: 236 K/UL — SIGNIFICANT CHANGE UP (ref 150–400)
POTASSIUM SERPL-MCNC: 4.4 MMOL/L — SIGNIFICANT CHANGE UP (ref 3.5–5.3)
POTASSIUM SERPL-SCNC: 4.4 MMOL/L — SIGNIFICANT CHANGE UP (ref 3.5–5.3)
RBC # BLD: 4.26 M/UL — SIGNIFICANT CHANGE UP (ref 3.8–5.2)
RBC # FLD: 12.7 % — SIGNIFICANT CHANGE UP (ref 10.3–14.5)
SODIUM SERPL-SCNC: 135 MMOL/L — SIGNIFICANT CHANGE UP (ref 135–145)
WBC # BLD: 10.07 K/UL — SIGNIFICANT CHANGE UP (ref 3.8–10.5)
WBC # FLD AUTO: 10.07 K/UL — SIGNIFICANT CHANGE UP (ref 3.8–10.5)

## 2022-06-08 PROCEDURE — 82962 GLUCOSE BLOOD TEST: CPT

## 2022-06-08 PROCEDURE — 97530 THERAPEUTIC ACTIVITIES: CPT

## 2022-06-08 PROCEDURE — 97165 OT EVAL LOW COMPLEX 30 MIN: CPT

## 2022-06-08 PROCEDURE — 88304 TISSUE EXAM BY PATHOLOGIST: CPT

## 2022-06-08 PROCEDURE — C1889: CPT

## 2022-06-08 PROCEDURE — 97161 PT EVAL LOW COMPLEX 20 MIN: CPT

## 2022-06-08 PROCEDURE — C1713: CPT

## 2022-06-08 PROCEDURE — 85025 COMPLETE CBC W/AUTO DIFF WBC: CPT

## 2022-06-08 PROCEDURE — 88311 DECALCIFY TISSUE: CPT

## 2022-06-08 PROCEDURE — 97535 SELF CARE MNGMENT TRAINING: CPT

## 2022-06-08 PROCEDURE — 36415 COLL VENOUS BLD VENIPUNCTURE: CPT

## 2022-06-08 PROCEDURE — 94664 DEMO&/EVAL PT USE INHALER: CPT

## 2022-06-08 PROCEDURE — 99232 SBSQ HOSP IP/OBS MODERATE 35: CPT

## 2022-06-08 PROCEDURE — 72040 X-RAY EXAM NECK SPINE 2-3 VW: CPT | Mod: 26

## 2022-06-08 PROCEDURE — 99221 1ST HOSP IP/OBS SF/LOW 40: CPT

## 2022-06-08 PROCEDURE — 80048 BASIC METABOLIC PNL TOTAL CA: CPT

## 2022-06-08 PROCEDURE — 97116 GAIT TRAINING THERAPY: CPT

## 2022-06-08 PROCEDURE — 76000 FLUOROSCOPY <1 HR PHYS/QHP: CPT

## 2022-06-08 PROCEDURE — 72040 X-RAY EXAM NECK SPINE 2-3 VW: CPT

## 2022-06-08 RX ORDER — POLYETHYLENE GLYCOL 3350 17 G/17G
17 POWDER, FOR SOLUTION ORAL
Qty: 0 | Refills: 0 | DISCHARGE
Start: 2022-06-08

## 2022-06-08 RX ORDER — OMEPRAZOLE 10 MG/1
0 CAPSULE, DELAYED RELEASE ORAL
Qty: 0 | Refills: 1 | DISCHARGE

## 2022-06-08 RX ORDER — INSULIN GLARGINE 100 [IU]/ML
5 INJECTION, SOLUTION SUBCUTANEOUS AT BEDTIME
Refills: 0 | Status: DISCONTINUED | OUTPATIENT
Start: 2022-06-08 | End: 2022-06-08

## 2022-06-08 RX ORDER — METFORMIN HYDROCHLORIDE 850 MG/1
1000 TABLET ORAL
Refills: 0 | Status: DISCONTINUED | OUTPATIENT
Start: 2022-06-08 | End: 2022-06-08

## 2022-06-08 RX ORDER — GLIMEPIRIDE 1 MG
0 TABLET ORAL
Qty: 0 | Refills: 0 | DISCHARGE

## 2022-06-08 RX ORDER — SENNA PLUS 8.6 MG/1
2 TABLET ORAL
Qty: 0 | Refills: 0 | DISCHARGE
Start: 2022-06-08

## 2022-06-08 RX ORDER — LOSARTAN POTASSIUM 100 MG/1
0 TABLET, FILM COATED ORAL
Qty: 0 | Refills: 5 | DISCHARGE

## 2022-06-08 RX ORDER — METFORMIN HYDROCHLORIDE 850 MG/1
1 TABLET ORAL
Qty: 0 | Refills: 0 | DISCHARGE
Start: 2022-06-08

## 2022-06-08 RX ORDER — DONEPEZIL HYDROCHLORIDE 10 MG/1
0 TABLET, FILM COATED ORAL
Qty: 0 | Refills: 0 | DISCHARGE

## 2022-06-08 RX ORDER — HYDROMORPHONE HYDROCHLORIDE 2 MG/ML
1 INJECTION INTRAMUSCULAR; INTRAVENOUS; SUBCUTANEOUS
Qty: 42 | Refills: 0
Start: 2022-06-08 | End: 2022-06-14

## 2022-06-08 RX ORDER — MECLIZINE HCL 12.5 MG
12.5 TABLET ORAL
Refills: 0 | Status: DISCONTINUED | OUTPATIENT
Start: 2022-06-08 | End: 2022-06-08

## 2022-06-08 RX ORDER — RIVASTIGMINE 4.6 MG/24H
0 PATCH, EXTENDED RELEASE TRANSDERMAL
Qty: 0 | Refills: 0 | DISCHARGE

## 2022-06-08 RX ORDER — METFORMIN HYDROCHLORIDE 850 MG/1
500 TABLET ORAL
Refills: 0 | Status: DISCONTINUED | OUTPATIENT
Start: 2022-06-08 | End: 2022-06-08

## 2022-06-08 RX ORDER — INSULIN LISPRO 100/ML
VIAL (ML) SUBCUTANEOUS
Refills: 0 | Status: DISCONTINUED | OUTPATIENT
Start: 2022-06-08 | End: 2022-06-08

## 2022-06-08 RX ORDER — INSULIN LISPRO 100/ML
VIAL (ML) SUBCUTANEOUS AT BEDTIME
Refills: 0 | Status: DISCONTINUED | OUTPATIENT
Start: 2022-06-08 | End: 2022-06-08

## 2022-06-08 RX ORDER — FLUTICASONE FUROATE AND VILANTEROL TRIFENATATE 100; 25 UG/1; UG/1
1 POWDER RESPIRATORY (INHALATION)
Qty: 0 | Refills: 0 | DISCHARGE

## 2022-06-08 RX ORDER — ACETAMINOPHEN 500 MG
2 TABLET ORAL
Qty: 0 | Refills: 0 | DISCHARGE
Start: 2022-06-08

## 2022-06-08 RX ORDER — METFORMIN HYDROCHLORIDE 850 MG/1
1 TABLET ORAL
Qty: 0 | Refills: 3 | DISCHARGE

## 2022-06-08 RX ORDER — RIVASTIGMINE 4.6 MG/24H
1 PATCH, EXTENDED RELEASE TRANSDERMAL
Qty: 0 | Refills: 0 | DISCHARGE

## 2022-06-08 RX ORDER — INSULIN GLARGINE 100 [IU]/ML
3 INJECTION, SOLUTION SUBCUTANEOUS AT BEDTIME
Refills: 0 | Status: DISCONTINUED | OUTPATIENT
Start: 2022-06-08 | End: 2022-06-08

## 2022-06-08 RX ORDER — ATORVASTATIN CALCIUM 80 MG/1
0 TABLET, FILM COATED ORAL
Qty: 0 | Refills: 2 | DISCHARGE

## 2022-06-08 RX ORDER — METFORMIN HYDROCHLORIDE 850 MG/1
1000 TABLET ORAL
Qty: 0 | Refills: 0 | DISCHARGE

## 2022-06-08 RX ORDER — AMLODIPINE BESYLATE 2.5 MG/1
0 TABLET ORAL
Qty: 0 | Refills: 2 | DISCHARGE

## 2022-06-08 RX ORDER — INSULIN GLARGINE 100 [IU]/ML
0 INJECTION, SOLUTION SUBCUTANEOUS
Qty: 0 | Refills: 0 | DISCHARGE

## 2022-06-08 RX ADMIN — Medication 1000 MILLIGRAM(S): at 06:02

## 2022-06-08 RX ADMIN — Medication 1000 MILLIGRAM(S): at 15:35

## 2022-06-08 RX ADMIN — Medication 1000 MILLIGRAM(S): at 05:41

## 2022-06-08 RX ADMIN — Medication 12.5 MILLIGRAM(S): at 12:25

## 2022-06-08 RX ADMIN — METFORMIN HYDROCHLORIDE 1000 MILLIGRAM(S): 850 TABLET ORAL at 08:33

## 2022-06-08 RX ADMIN — Medication 1000 MILLIGRAM(S): at 15:05

## 2022-06-08 RX ADMIN — PANTOPRAZOLE SODIUM 40 MILLIGRAM(S): 20 TABLET, DELAYED RELEASE ORAL at 05:42

## 2022-06-08 RX ADMIN — INSULIN GLARGINE 5 UNIT(S): 100 INJECTION, SOLUTION SUBCUTANEOUS at 00:57

## 2022-06-08 RX ADMIN — ONDANSETRON 4 MILLIGRAM(S): 8 TABLET, FILM COATED ORAL at 06:23

## 2022-06-08 RX ADMIN — Medication 6: at 12:24

## 2022-06-08 RX ADMIN — Medication 2: at 08:16

## 2022-06-08 RX ADMIN — Medication 100 MILLIGRAM(S): at 05:00

## 2022-06-08 NOTE — DISCHARGE NOTE PROVIDER - NSDCCPTREATMENT_GEN_ALL_CORE_FT
PRINCIPAL PROCEDURE  Procedure: Laminectomy, spine, cervical, posterior approach, with fusion using instrumentation  Findings and Treatment: C3-6 for spinal stenosis.

## 2022-06-08 NOTE — DISCHARGE NOTE PROVIDER - NSDCHC_MEDRECSTATUS_GEN_ALL_CORE
HISTORY AND PHYSICAL  (Hospitalist, Internal Medicine)  IDENTIFYING INFORMATION   PATIENT:  Hyun Webster  MRN:  [id number]  ADMIT DATE: 3/11/2020  TIME OF EVALUATION: 3/12/2020 5:17 AM    CHIEF COMPLAINT     Shortness of breath  HISTORY OF PRESENT ILLNESS   Hyun Webster is a 54 y.o. male admitted for shortness of breath that started a couple days ago. Patient states that he has a history of COPD, has been wheezing. He has a productive cough however it clears to white in color. States that he has no fevers, has not had any sick contacts in the last few days. Has not traveled last 3 months. Of note patient also has a history of CHF, he states that he was recently admitted and they took a lot of fluid out of them. At this time he states that he has swelling in his legs, and shortness of breath upon ambulation. He also has a hard time laying flat because of the breathing. Pt otherwise has no complaints of CP, dizziness, N/V/C/D, abdominal pain, dysuria, joint pains, rash/boils, or fevers.        PMH listed below:    PAST MEDICAL, SURGICAL, FAMILY, and SOCIAL HISTORY     Past Medical History:   Diagnosis Date    CAD (coronary artery disease)     CHF (congestive heart failure) (HCC)     [facility]     Depression     Drug abuse ([facility]     HIGH CHOLESTEROL     Hypertension     MI (myocardial infarction) ([facility] 2011    S/P coronary artery bypass graft x 4 2011    CABG x 4 Dr Maria T Graham     Past Surgical History:   Procedure Laterality Date    BYPASS GRAFT  04/10/2011    CABG x 4 Dr Shellia Jeans  11/2010     4 stents    LEG SURGERY       Family History   Problem Relation Age of Onset    Cancer Father     Heart Failure Father     Heart Disease Father     Diabetes Mother     Heart Disease Mother      Family Hx of HTN  Family Hx as reviewed above, otherwise non-contributory  Social History     Socioeconomic History    Marital status:      Spouse name: None    Number of children: None    Years of education: None    Highest education level: None   Occupational History    None   Social Needs    Financial resource strain: None    Food insecurity     Worry: None     Inability: None    Transportation needs     Medical: None     Non-medical: None   Tobacco Use    Smoking status: Current Every Day Smoker     Packs/day: 1.00     Years: 20.00     Pack years: 20.00     Types: Cigarettes    Smokeless tobacco: Former User    Tobacco comment: Reviewed 10/9/17   Substance and Sexual Activity    Alcohol use: No     Alcohol/week: 0.0 standard drinks    Drug use: No     Comment: march 2018 states he quit    Sexual activity: Not Currently   Lifestyle    Physical activity     Days per week: None     Minutes per session: None    Stress: None   Relationships    Social connections     Talks on phone: None     Gets together: None     Attends Yazdanism service: None     Active member of club or organization: None     Attends meetings of clubs or organizations: None     Relationship status: None    Intimate partner violence     Fear of current or ex partner: None     Emotionally abused: None     Physically abused: None     Forced sexual activity: None   Other Topics Concern    None   Social History Narrative    None       MEDICATIONS   Medications Prior to Admission  Not in a hospital admission.     Current Medications  Current Facility-Administered Medications   Medication Dose Route Frequency Provider Last Rate Last Dose    doxycycline hyclate (VIBRA-TABS) tablet 100 mg  100 mg Oral Once Ar Massey MD        sodium chloride flush 0.9 % injection 10 mL  10 mL Intravenous BID KRISSY Rivas   10 mL at 03/11/20 2994     Current Outpatient Medications   Medication Sig Dispense Refill    albuterol sulfate HFA (PROVENTIL HFA) 108 (90 Base) MCG/ACT inhaler Inhale 2 puffs into the lungs every 4 hours as needed for Wheezing or Shortness of Breath With spacer Admission Reconciliation is Not Complete  Discharge Reconciliation is Not Complete Blood pressure 118/78, pulse 110, temperature 98.7 °F (37.1 °C), temperature source Oral, resp. rate 18, height 5' 11\" (1.803 m), weight 190 lb (86.2 kg), SpO2 98 %. General - AAO x 3  Psych - Appropriate affect/speech. No agitation  Eyes - Eye lids intact. No scleral icterus  ENT - Lips wnl. External ear clear/dry/intact. No thyromegaly on inspection  Neuro - No gross peripheral or central neuro deficits on inspection  Heart - Sinus. RRR. S1 and S2 present. No added HS/murmurs appreciated. No elevated JVD; b/l LE edema +2 appreciated  Lung - Adequate air entry b/l, bilateral wheezing  GI - Soft. No guarding/rigidity. No hepatosplenomegaly/ascites. BS+   - No CVA/suprapubic tenderness or palpable bladder distension  Skin - Intact. No rash/petechiae/ecchymosis. Warm extremities  MSK - Joints with normal ROM. No joint swellings;      Lines/Drains/Airways/Wounds:  [unfilled]    LABS AND IMAGING   CBC  [unfilled]    Last 3 Hemoglobin  Lab Results   Component Value Date    HGB 10.6 03/11/2020    HGB 10.6 02/14/2020    HGB 9.8 02/10/2020     Last 3 WBC/ANC  Lab Results   Component Value Date    WBC 8.0 03/11/2020    WBC 7.0 02/14/2020    WBC 7.6 02/10/2020     No components found for: GRNLOCTYABS  Last 3 Platelets  No results found for: PLATELET  Chemistry  [unfilled]  [unfilled]  No results found for: LDH  Coagulation Studies  Lab Results   Component Value Date    INR 1.10 07/11/2018     Liver Function Studies  Lab Results   Component Value Date    ALT 22 03/11/2020    AST 30 03/11/2020    ALKPHOS 90 03/11/2020       Recent Imaging        Relevant labs and imaging reviewed    ASSESSMENT AND PLAN   Sabrina Duncan is a 54 y.o. male p/w       COPD exacerbation, unlikely infection  - trop wnl  - CXR w/o infilitrates  - prn BiPAP  - continous pulse ox, tele  - solumedrol IV, doxycycline  - c/w duonebs  - pulm consult      On Eliquis for possible recent PE  -However has no radiographic evidence  -We will consult Admission Reconciliation is Completed  Discharge Reconciliation is Completed

## 2022-06-08 NOTE — OCCUPATIONAL THERAPY INITIAL EVALUATION ADULT - ADDITIONAL COMMENTS
Lives with spouse and daughter. 4 steps to enter. No steps inside.  Pt states she had a lot of difficulty walking due to leg weakness and has numbness both hands/feet.

## 2022-06-08 NOTE — PROGRESS NOTE ADULT - SUBJECTIVE AND OBJECTIVE BOX
Discharge medication calendar:  APAP 1000mg q8h x 2-3 weeks  Narcotic PRN  Docusate 100mg TID while taking narcotic  Miralax, Senna, or Bisacodyl PRN for treatment of constipation  
                                                                             Orthopedic Spine Progress Note      POST OPERATIVE DAY #: 1  STATUS POST:      C3-6 laminectomy and fusion           Pre-Op Dx: Myelopathy concurrent with and due to spinal stenosis of cervical region      Post-Op Dx:  Myelopathy concurrent with and due to spinal stenosis of cervical region      Prin. Procedure:      SUBJECTIVE: Patient seen and examined. c/o nausea and fatigue, mild headache,  no shooting pains , no new numbness    Pain (0-10):   Current Pain Management:  Po Analgesics   IV Anagesics     Vital Signs Last 24 Hrs  T(C): 36.5 (08 Jun 2022 07:50), Max: 36.6 (07 Jun 2022 17:00)  T(F): 97.7 (08 Jun 2022 07:50), Max: 97.8 (07 Jun 2022 17:00)  HR: 68 (08 Jun 2022 07:50) (56 - 74)  BP: 144/- (08 Jun 2022 07:50) (115/58 - 167/67)  BP(mean): --  RR: 16 (08 Jun 2022 07:50) (10 - 24)  SpO2: 95% (08 Jun 2022 07:50) (94% - 100%)  I&O's Detail    07 Jun 2022 07:01  -  08 Jun 2022 07:00  --------------------------------------------------------  IN:    Lactated Ringers: 1800 mL  Total IN: 1800 mL    OUT:    Accordian (mL): 70 mL    Blood Loss (mL): 50 mL    Indwelling Catheter - Urethral (mL): 2600 mL  Total OUT: 2720 mL    Total NET: -920 mL          OBJECTIVE:         Wound /Dressing: clean and dry      Neurological: A/O x   3            Sensation: intact to light touch          Motor exam:           Upper extremity               Delt      Bicp       Tri          Digit Ext Digit Flex                                     R         5/5        5/5        5/5       5/5            5/5            5/5                                          L          5/5        5/5        5/5       5/5            5/5            5/5              [ ] Lower ext.              Quad   Hamstrg   TA       EHL      GS                              R        5/5        5/5        5/5             5/5        5/5                                     L         5/5        5/5        5/5             5/5        5/5                                                             [ ] Vascular :  feet +DP             RADIOLOGY & ADDITIONAL STUDIES:                                               LABS:                        12.8   10.07 )-----------( 236      ( 08 Jun 2022 06:30 )             38.7     06-08    135  |  99  |  19  ----------------------------<  263<H>  4.4   |  28  |  0.78    Ca    9.1      08 Jun 2022 06:30          Blood Culture:   Wound Culture:     A/P :  81y Female   s/p:     C3-6 laminectomy and fusion   -    Pain control adequate  -    DVT ppx: SCDs    -    Abx:  completed  -    Review labs as indicated - blood sugars to be addressed by hospitalist    -    Physical Therapy  -    Weight bearing status: WBAT with collar in pl;ace  -    Neel PEDRAZA ordered  -    Family declines rehab  -    Dispo: Home    
Bhavesh Pascal M.D.    Patient is a 81y old  Female who presents with a chief complaint of Cervical myelopathy (08 Jun 2022 08:34)      SUBJECTIVE / OVERNIGHT EVENTS: reports dizziness (lightheadedness) with some nausea.      Patient denies chest pain, SOB, abd pain, fever, chills, dysuria or any other complaints. All remainder ROS negative.     MEDICATIONS  (STANDING):  acetaminophen     Tablet .. 1000 milliGRAM(s) Oral every 8 hours  atorvastatin 20 milliGRAM(s) Oral at bedtime  dextrose 5%. 1000 milliLiter(s) (100 mL/Hr) IV Continuous <Continuous>  dextrose 5%. 1000 milliLiter(s) (50 mL/Hr) IV Continuous <Continuous>  dextrose 50% Injectable 25 Gram(s) IV Push once  dextrose 50% Injectable 12.5 Gram(s) IV Push once  dextrose 50% Injectable 25 Gram(s) IV Push once  glucagon  Injectable 1 milliGRAM(s) IntraMuscular once  insulin glargine Injectable (LANTUS) 5 Unit(s) SubCutaneous at bedtime  insulin lispro (ADMELOG) corrective regimen sliding scale   SubCutaneous three times a day before meals  insulin lispro (ADMELOG) corrective regimen sliding scale   SubCutaneous at bedtime  lactated ringers. 1000 milliLiter(s) (100 mL/Hr) IV Continuous <Continuous>  meclizine 12.5 milliGRAM(s) Oral two times a day  metFORMIN 1000 milliGRAM(s) Oral two times a day  pantoprazole    Tablet 40 milliGRAM(s) Oral before breakfast  polyethylene glycol 3350 17 Gram(s) Oral at bedtime  senna 2 Tablet(s) Oral at bedtime  tranexamic acid IVPB 938 milliGRAM(s) IV Intermittent once    MEDICATIONS  (PRN):  aluminum hydroxide/magnesium hydroxide/simethicone Suspension 30 milliLiter(s) Oral every 12 hours PRN Indigestion  dextrose Oral Gel 15 Gram(s) Oral once PRN Blood Glucose LESS THAN 70 milliGRAM(s)/deciliter  HYDROmorphone   Tablet 2 milliGRAM(s) Oral every 3 hours PRN Moderate Pain (4 - 6)  HYDROmorphone   Tablet 4 milliGRAM(s) Oral every 3 hours PRN Severe Pain (7 - 10)  HYDROmorphone  Injectable 0.5 milliGRAM(s) IV Push every 3 hours PRN breakthrough pain  ondansetron Injectable 4 milliGRAM(s) IV Push every 6 hours PRN Nausea and/or Vomiting      I&O's Summary    07 Jun 2022 07:01  -  08 Jun 2022 07:00  --------------------------------------------------------  IN: 1800 mL / OUT: 2720 mL / NET: -920 mL        PHYSICAL EXAM:  Vital Signs Last 24 Hrs  T(C): 36.5 (08 Jun 2022 07:50), Max: 36.6 (07 Jun 2022 17:00)  T(F): 97.7 (08 Jun 2022 07:50), Max: 97.8 (07 Jun 2022 17:00)  HR: 75 (08 Jun 2022 09:00) (56 - 75)  BP: 184/78 (08 Jun 2022 09:54) (115/58 - 184/78)  BP(mean): --  RR: 16 (08 Jun 2022 07:50) (10 - 24)  SpO2: 96% (08 Jun 2022 09:54) (94% - 100%)    CONSTITUTIONAL: NAD, well-groomed  ENMT: Moist oral mucosa, no pharyngeal injection or exudates; normal dentition  RESPIRATORY: Normal respiratory effort; lungs are clear to auscultation bilaterally  CARDIOVASCULAR: Regular rate and rhythm, normal S1 and S2, no murmur/rub/gallop; No lower extremity edema  ABDOMEN: Nontender to palpation, normoactive bowel sounds, no rebound/guarding; No hepatosplenomegaly  PSYCH: A+O x3; affect appropriate  NEUROLOGY: CN 2-12 are intact and symmetric; no gross sensory deficits;   SKIN: No rashes; no palpable lesions    LABS:                        12.8   10.07 )-----------( 236      ( 08 Jun 2022 06:30 )             38.7     06-08    135  |  99  |  19  ----------------------------<  263<H>  4.4   |  28  |  0.78    Ca    9.1      08 Jun 2022 06:30                CAPILLARY BLOOD GLUCOSE      POCT Blood Glucose.: 249 mg/dL (08 Jun 2022 07:41)  POCT Blood Glucose.: 252 mg/dL (07 Jun 2022 21:45)  POCT Blood Glucose.: 210 mg/dL (07 Jun 2022 17:05)      RADIOLOGY & ADDITIONAL TESTS:  Results Reviewed:   Imaging Personally Reviewed:  Electrocardiogram Personally Reviewed:

## 2022-06-08 NOTE — CONSULT NOTE ADULT - CONSULT REASON
81y A1C with Estimated Average Glucose Result: 8.4 % (05-18-22 @ 11:35)   diabetes mellitus uncontrolled type 2
medical management

## 2022-06-08 NOTE — DISCHARGE NOTE PROVIDER - NSDCFUADDINST_GEN_ALL_CORE_FT
- Call your doctor if you experience:  • An increase in pain not controlled by pain medication or change in activity or  position.  • Temperature greater than 101° F.  • Redness, increased swelling or foul smelling drainage from or around the  incision.  • Numbness, tingling or a change in color or temperature of the legs.  • Call your doctor immediately if you experience chest pain, shortness of breath or calf pain.

## 2022-06-08 NOTE — CONSULT NOTE ADULT - ASSESSMENT
81yFemale  with DM2 on insulin, HTN, HLD, cervical spinal stenosis, COPD, nicotine dependence, osteoporosis who presented electively today for cervical laminectomy and fusion.       Cervical laminectomy and fusion  - avoid DVT ppx given spine surgery  - pain control  - advance diet as tolerated  - anti-emetics PRN  - incentive spirometer  - bowel regiment    DM2   - would reduce lantus to 5 units as inpatient (about 20-30% reduction)  - start low dose insulin sliding scale with FS qAC and qHS  - diabetic diet  - ok to continue metformin (as per daughter, takes 1000mg BID not 500mg BID)  - hold glimepride    HTN/HLD  - cont with amlodipine and losartan with hold parameters on POD #2  - cont with atorvastatin    Dementia  - as per daughter patient takes rivastigmine in lieu of donepezil  - PO rivastigmine not available here -> patient likely to be dc'ed tomorrow and can therefore continue her medication at home (however if patient does stay longer, will need to obtain medication from home)    GERD  - cont omeprazole or equivalent    Preventive measures  - hold AC 2/2 recent spine surgery  - SCD for now  
Physical Exam:   Vital Signs Last 24 Hrs  T(C): 36.8 (08 Jun 2022 15:40), Max: 36.8 (08 Jun 2022 15:40)  T(F): 98.3 (08 Jun 2022 15:40), Max: 98.3 (08 Jun 2022 15:40)  HR: 68 (08 Jun 2022 15:40) (56 - 82)  BP: 150/69 (08 Jun 2022 15:40) (115/58 - 186/71)  BP(mean): --  RR: 18 (08 Jun 2022 15:40) (10 - 18)  SpO2: 96% (08 Jun 2022 15:40) (94% - 100%)    General: NAD, denies Fever, chills  CVS: S1S2 no M/R/G  Resp: LLL wheeze           CAPILLARY BLOOD GLUCOSE      POCT Blood Glucose.: 284 mg/dL (08 Jun 2022 12:15)  POCT Blood Glucose.: 249 mg/dL (08 Jun 2022 07:41)  POCT Blood Glucose.: 252 mg/dL (07 Jun 2022 21:45)  POCT Blood Glucose.: 210 mg/dL (07 Jun 2022 17:05)      Cholesterol, Serum: 113 mg/dL (05.19.21 @ 08:36)     HDL Cholesterol, Serum: 22 mg/dL (05.19.21 @ 08:36)     LDL Cholesterol Calculated: 66 mg/dL (05.19.21 @ 08:36)     DIET: CC  >50%

## 2022-06-08 NOTE — DISCHARGE NOTE PROVIDER - NSDCMRMEDTOKEN_GEN_ALL_CORE_FT
amlodipine 5 mg tablet: tab(s) orally once a day  atorvastatin 20 mg tablet: tab(s) orally once a day  Breo Ellipta 100 mcg-25 mcg/inh inhalation powder: 1 puff(s) inhaled once a day  GLIMEPIRIDE 4 MG TABLET: each orally once a day  losartan 100 mg tablet: tab(s) orally once a day  metFORMIN 500 mg oral tablet: 1000 milligram(s) orally 2 times a day  Miami J Collar: Wear at all times except during meals and for hygiene.s/p Cervical laminectomy and fusion.    omeprazole 20 mg capsule,delayed release: cap(s) orally once a day  rivastigmine 1.5 mg oral capsule: 1 cap(s) orally 2 times a day  Toujeo SoloStar 300 units/mL subcutaneous solution: 7 units at night   acetaminophen 500 mg oral tablet: 2 tab(s) orally every 8 hours  amlodipine 5 mg tablet: tab(s) orally once a day  atorvastatin 20 mg tablet: tab(s) orally once a day  Breo Ellipta 100 mcg-25 mcg/inh inhalation powder: 1 puff(s) inhaled once a day  GLIMEPIRIDE 4 MG TABLET: each orally once a day  HYDROmorphone 2 mg oral tablet: 1-2 tab(s) orally every 4 hours, As Needed -Moderate to severe  Pain MDD:6  : [id number]  losartan 100 mg tablet: tab(s) orally once a day  metFORMIN 500 mg oral tablet: 1 tab(s) orally 2 times a day  [place] J Collar: Wear at all times except during meals and for hygiene.s/p Cervical laminectomy and fusion.    omeprazole 20 mg capsule,delayed release: cap(s) orally once a day  polyethylene glycol 3350 oral powder for reconstitution: 17 gram(s) orally once a day (at bedtime), As Needed  rivastigmine 1.5 mg oral capsule: 1 cap(s) orally 2 times a day  senna oral tablet: 2 tab(s) orally once a day (at bedtime), As Needed  Toujeo SoloStar 300 units/mL subcutaneous solution: 7 units at night

## 2022-06-08 NOTE — PHARMACOTHERAPY INTERVENTION NOTE - COMMENTS
Transition of Care video discharge education - medication calendar given to patient Pharmacy fill confirmed. Patient was instructed NOT to smoke postop.
Admission medication reconciliation POD1

## 2022-06-08 NOTE — DISCHARGE NOTE PROVIDER - INSTRUCTIONS
For Constipation :   • Increase your water intake. Drink at least 8 glasses of water daily.  • Try adding fiber to your diet by eating fruits, vegetables and foods that are rich in grains.  • If you do experience constipation, you may take an over-the-counter stool softener/laxative such as Keyla Colace, Senekot, miralax or  Milk of Magnesia.

## 2022-06-08 NOTE — DISCHARGE NOTE NURSING/CASE MANAGEMENT/SOCIAL WORK - NSDCPEFALRISK_GEN_ALL_CORE
For information on Fall & Injury Prevention, visit: [email or web address] OR  [email or web address] OR  https://www.cdc.gov/steadi/patient.html

## 2022-06-08 NOTE — CHART NOTE - NSCHARTNOTEFT_GEN_A_CORE
Order received from ortho pa to fit patient with Nolan J collar to be used following surgery.  Phila collar removed and replaced with Nolan J.  Pt and spouse instructed to don and doff  Instrucxtions left at bedside with office contact information if any problems arise.  Follow up if needed    [employer]  [phone]  [facility] p[facility]o

## 2022-06-08 NOTE — DISCHARGE NOTE PROVIDER - HOSPITAL COURSE
This 80yo female patient was admitted to [facility] on 6/7/22 with a history of chronic neck pain with extremity weakness and for decompressive neck surgery aned hardware stabilization.  Patient had appropriate preop medical evaluation and testing.    Patient received antibiotics according to SCIP guidelines for infection prevention.  The patient underwent an uncomplicated posterior laminectomy C3-C6 with posterior arthrodesis by Dr. Chicho Mo on 6/7.   PAS were ordered for DVT prophylaxis.  Anesthesia, Medical Hospitalist, Physical Therapy and Occupational Therapy were consulted.  Patient is stable for discharge home with full assistance from her family and with a good prognosis on 6/8/22.  Appropriate discharge instructions and medications are provided in this document.

## 2022-06-08 NOTE — DISCHARGE NOTE PROVIDER - PROVIDER TOKENS
PROVIDER:[TOKEN:[[id number]]] PROVIDER:[TOKEN:[[id number]],SCHEDULEDAPPT:[06/20/2022],SCHEDULEDAPPTTIME:[10:00 AM],ESTABLISHEDPATIENT:[T]]

## 2022-06-08 NOTE — DISCHARGE NOTE PROVIDER - CARE PROVIDER_API CALL
Chicho Mo)  Orthopaedic Surgery  [address] [place] [address]  Phone: [phone]  Fax: [phone]  Follow Up Time:    Chicho Mo)  Orthopaedic Surgery  [address] [place] [address]  Phone: [phone]  Fax: [phone]  Established Patient  Scheduled Appointment: 06/20/2022 10:00 AM

## 2022-06-08 NOTE — CONSULT NOTE ADULT - SUBJECTIVE AND OBJECTIVE BOX
Patient is a 81y old  Female who presents with a chief complaint of chronic neck pain from multilevel cervical stenosis with myelopathy (08 Jun 2022 14:01)    information obtained from patient and patient daughter/caregiver JOSE RAUL VERAS:  Type 2 DM DX 20 years ago, no known complications or hx DKA/HSS. followed by Endocrine Dr. Reed Glaser ([facility]), last seen 3 weeks ago for surgical clearance., A1c 8.4%, pt daughter reports Dr. Glaser is ok w/ this number. home regimen Metformin 1000mg BID, Glimepiride 4mg daily, Toujeo 7 units @ HS. pt has not been checking F/S @ home for past year, reports has been checking daily since pre surgical testing, reports readings 150-211. pt states she will continue checking in AM and after Dinner. pt eats 3 meals a day, primarily carbohydrates, eats a lot of pasta, started eating more vegetables and protein recently. pt physically active but denies, exercise  diabetes education provided- A1c measure and BG target , medication MOA and contraindications, BGM frequency @ home, insulin dosage and administration, consistent carbohydrate diet and portion control, s/s of hyper/hypoglycemia and management  Hx HLD    in hospital:  Accucheck ACHS  restart metformin 1000mg BID  Lantus 5 Units HS  moderate ISS  HPI:      PAST MEDICAL & SURGICAL HISTORY:  H/O spinal stenosis      Hypertension      HLD (hyperlipidemia)      Type 2 diabetes mellitus      Dementia      S/P carpal tunnel release  right      History of bladder surgery          REVIEW OF SYSTEMS  General: alert and oriented  Respiratory: NAD, No SOB, no cough  Cardiovascular: No chest pain, no palpitations	    Allergies    codeine (Vomiting; Nausea)    Intolerances    donepezil (Nausea)      MEDICATIONS  (STANDING):  acetaminophen     Tablet .. 1000 milliGRAM(s) Oral every 8 hours  atorvastatin 20 milliGRAM(s) Oral at bedtime  dextrose 5%. 1000 milliLiter(s) (100 mL/Hr) IV Continuous <Continuous>  dextrose 5%. 1000 milliLiter(s) (50 mL/Hr) IV Continuous <Continuous>  dextrose 50% Injectable 25 Gram(s) IV Push once  dextrose 50% Injectable 12.5 Gram(s) IV Push once  dextrose 50% Injectable 25 Gram(s) IV Push once  glucagon  Injectable 1 milliGRAM(s) IntraMuscular once  insulin glargine Injectable (LANTUS) 5 Unit(s) SubCutaneous at bedtime  insulin lispro (ADMELOG) corrective regimen sliding scale   SubCutaneous at bedtime  insulin lispro (ADMELOG) corrective regimen sliding scale   SubCutaneous three times a day before meals  lactated ringers. 1000 milliLiter(s) (100 mL/Hr) IV Continuous <Continuous>  meclizine 12.5 milliGRAM(s) Oral two times a day  metFORMIN 500 milliGRAM(s) Oral two times a day  pantoprazole    Tablet 40 milliGRAM(s) Oral before breakfast  polyethylene glycol 3350 17 Gram(s) Oral at bedtime  senna 2 Tablet(s) Oral at bedtime  tranexamic acid IVPB 938 milliGRAM(s) IV Intermittent once      
  HPI:  81yFemale  with DM2 on insulin, HTN, HLD, cervical spinal stenosis, COPD, nicotine dependence, osteoporosis who presented electively today for cervical laminectomy and fusion.  Patient has been having pain in her neck with bilateral hand numbness and increase frequency of dropping things with her hand.  Patient had imaging done showed "degenerative disc disease and spinal stenosis more most marked at C3-4 and C5-6."   Patient recommended to have surgery.  Patient seen in her room post-operatively.  Patient has some pain in her neck and feels stiff but otherwise has no other acute complaints.  No complaints of numbness/weakness.   No chest pain, SOB, or nausea.  Resting in bed.      PAST MEDICAL & SURGICAL HISTORY:  H/O spinal stenosis  Hypertension  HLD (hyperlipidemia)  Type 2 diabetes mellitus  Dementia  S/P carpal tunnel release right  History of bladder surgery    REVIEW OF SYSTEMS:  CONSTITUTIONAL:    no fever or weight loss or fatigue  EYES:    no eye pain or visual disturbances or discharge  ENMT:     no difficulty hearing or tinnitus or vertigo, no sinus or throat pain  NECK:    no pain or stiffness  RESPIRATORY:    no cough or wheezing or chills or hemoptysis, no shortness of breath  CARDIOVASCULAR:    no chest pain or palpitations or dizziness or leg swelling  GASTROINTESTINAL:    no abdominal or epigastric pain, no nausea or vomiting or hematemesis, no diarrhea or constipation. no melena or hematochezia.  GENITOURINARY:    no dysuria or frequency or hematuria or incontinence  NEUROLOGICAL:    no headaches or memory loss or loss of strength or numbness or tremors  SKIN:    no itching or burning or rashes or lesions   LYMPH NODES:    no enlarged glands  ENDOCRINE:    no heat or cold intolerance, no hair loss, no polydipsia or polyuria  MUSCULOSKELETAL:    neck pain, no extremity pain  PSYCHIATRIC:    no depression or anxiety or mood swings or difficulty sleeping  HEME/LYMPH:    no easy bruising or bleeding gums  ALLERGY AND IMMUNOLOGIC:    no hives or eczema    HOME MEDICATIONS:    · 	Toujeo SoloStar 300 units/mL subcutaneous solution: Last Dose Taken:  , 7 units at night  · 	amlodipine 5 mg tablet: Last Dose Taken:  , tab(s) orally once a day  · 	atorvastatin 20 mg tablet: Last Dose Taken:  , tab(s) orally once a day  · 	GLIMEPIRIDE 4 MG TABLET: Last Dose Taken:  , each orally once a day  · 	losartan 100 mg tablet: Last Dose Taken:  , tab(s) orally once a day  · 	metformin 500 mg tablet: Last Dose Taken:  , 1 tab(s) orally 2 times a day  · 	rivastigmine 1.5 mg capsule: Last Dose Taken:  , cap(s) orally once a day          · 	omeprazole 20 mg capsule,delayed release: cap(s) orally once a day    ALLERGIES and INTOLERANCES:  codeine (Vomiting; Nausea)    SOCIAL HISTORY:  + current smoker, smokes about 0.6 ppk/day for about 30 years  - occasional etoh use   - lives with spouse and family    FAMILY HISTORY:  Family history of heart attack (Child)    PHYSICAL EXAM:  Vital Signs Last 24 Hrs  T(C): 36.6 (07 Jun 2022 17:00), Max: 36.6 (07 Jun 2022 17:00)  T(F): 97.8 (07 Jun 2022 17:00), Max: 97.8 (07 Jun 2022 17:00)  HR: 72 (07 Jun 2022 21:00) (56 - 74)  BP: 142/67 (07 Jun 2022 21:00) (115/58 - 167/67)  BP(mean): --  RR: 16 (07 Jun 2022 21:00) (10 - 24)  SpO2: 97% (07 Jun 2022 21:00) (97% - 100%)    GENERAL:     age appropriate female in NAD  HEAD:     atraumatic  EYES:     EOMI, conjunctiva and sclera clear  NECK:     in cervical collar  RESPIRATORY:     clear to auscultation bilaterally, no rales or rhonchi or wheezing or rubs  CARDIOVASCULAR:     regular rate and rhythm, no murmurs or rubs or gallops, 2+ peripheral pulses  GASTROINTESTINAL:     soft, nontender, nondistended, no hepatosplenomegaly palpated, bowel sounds present  EXTREMITIES:     no clubbing or cyanosis or edema  MUSCULOSKELETAL:     neck pain  NERVOUS SYSTEM:     able to move feet and hands, no loss of sensation  SKIN:     no rashes or lesions  PSYCH:     appropriate, alert and orientated x3, good concentration      LABS:                        13.6   7.05  )-----------( 250      ( 18 May 2022 11:35 )             41.7     18 May 2022 11:35    140    |  101    |  17     ----------------------------<  209<H>  4.7     |  30     |  0.91         Ca    9.7        18 May 2022 11:35    TPro  7.6    /  Alb  4.2    /  TBili  0.5    /  DBili  x      /  AST  19     /  ALT  21     /  AlkPhos  75     18 May 2022 11:35    LIVER FUNCTIONS - ( 18 May 2022 11:35 )  Alb: 4.2 g/dL / Pro: 7.6 g/dL / ALK PHOS: 75 U/L / ALT: 21 U/L DA / AST: 19 U/L / GGT: x           PT/INR - ( 18 May 2022 11:35 )   PT: 11.7 ;   INR: 0.99        PTT - ( 18 May 2022 11:35 )  PTT:30.7     RADIOLOGY & ADDITIONAL STUDIES:    EKG:  NSR

## 2022-06-08 NOTE — DISCHARGE NOTE PROVIDER - NSDCFUSCHEDAPPT_GEN_ALL_CORE_FT
Chicho Mo  [facility]  ORTHOSURG [address] [facility]  Scheduled Appointment: 06/20/2022

## 2022-06-08 NOTE — DISCHARGE NOTE PROVIDER - NSDCCPCAREPLAN_GEN_ALL_CORE_FT
PRINCIPAL DISCHARGE DIAGNOSIS  Diagnosis: Stenosis of cervical spine  Assessment and Plan of Treatment: C3-C6  You have had a 3 level cervical spine fusion.  Some precautions are advised.  Wear your collar at all times, except it may be opened in the front for meals and hygiene.  Avoid twisting and bending of neck.  No heavy lifting.  DO NOT drive. You may be driven but wear your collar in the car.     You may start your diet with cool liquids and advance to a regular diet as tolerated.    The pain medications are likely to cause constipation.  This problem can be minimized by increasing your fruits and vegetables and fluid intake.  You may benefit from over-the-counter laxatives (such as senokot) and stool softeners.  Avoid getting your dressing wet- careful bath or shower is appropriate.  Change dressing if it becomes wet or loose, using dry gauze and tape.  The steristrips should remain in place unless they are very loose at which time they can be removed.  Do NOT smoke or use tobacco products.  Do not use NSAID type drugs such as ibuprofen-(Advil, Motrin) and naprosyn (Aleve, naproxen). They can inhibit healing and stop the bones from fusing together.    Call your MD if you have new numbness, weakness, severe headache, swelling or bleeding.

## 2022-06-08 NOTE — CONSULT NOTE ADULT - PROBLEM SELECTOR RECOMMENDATION 9
Type 2 DM  A1c 8.4% adm spinal stenosis  Recommend endocrine-[facility]lman onconsult  FU appt: Dr Reed schilling  DSC recommendations: return to home regimen and glucose monitoring  diabetes education provided  Diabetes support info and cell # [phone] given   Goal 100-180 mg/dL; 140-180 mg/dL in critical care areas

## 2022-06-08 NOTE — PROGRESS NOTE ADULT - ASSESSMENT
81yFemale  with DM2 on insulin, HTN, HLD, cervical spinal stenosis, COPD, nicotine dependence, osteoporosis who presented electively today for cervical laminectomy and fusion.       Dizziness  Nausea  - trial meclizine 12.5mg BID  - PRN anti-emetics   - if persistent symptoms, would check CTH    Cervical laminectomy and fusion  - avoid DVT ppx given spine surgery  - pain control  - advance diet as tolerated  - anti-emetics PRN  - incentive spirometer  - bowel regiment    DM2   - c/w 5 units lantus + low dose insulin sliding scale with FS qAC and qHS  - diabetic diet  - ok to continue metformin (as per daughter, takes 1000mg BID not 500mg BID)  - hold glimepride    HTN/HLD  - cont with amlodipine and losartan with hold parameters on POD #2  - cont with atorvastatin    Dementia  - as per daughter patient takes rivastigmine in lieu of donepezil  - PO rivastigmine not available here -> patient likely to be dc'ed in 1-2 days and can therefore continue her medication at home (however if patient does stay longer, will need to obtain medication from home)    GERD  - cont omeprazole or equivalent    Preventive measures  - hold AC 2/2 recent spine surgery  - SCD for now   81yFemale  with DM2 on insulin, HTN, HLD, cervical spinal stenosis, COPD, nicotine dependence, osteoporosis who presented electively today for cervical laminectomy and fusion.       Dizziness  Nausea  - trial meclizine 12.5mg BID  - PRN anti-emetics   - if persistent symptoms, would check CTH    Cervical laminectomy and fusion  - avoid DVT ppx given spine surgery  - pain control  - advance diet as tolerated  - anti-emetics PRN  - incentive spirometer  - bowel regiment    DM2   - c/w 5 units lantus + low dose insulin sliding scale with FS qAC and qHS  - diabetic diet  - ok to continue metformin (as per daughter, takes 1000mg BID not 500mg BID)  - hold glimepride    HTN/HLD  - cont with amlodipine and losartan with hold parameters on POD #2  - cont with atorvastatin    Dementia  - as per daughter patient takes rivastigmine in lieu of donepezil  - PO rivastigmine not available here -> patient likely to be dc'ed in 1-2 days and can therefore continue her medication at home (however if patient does stay longer, will need to obtain medication from home)    GERD  - cont omeprazole or equivalent    Preventive measures  - hold AC 2/2 recent spine surgery  - SCD for now    Patient medically stable for discharge after resolution of dizziness/nausea, discharge per ortho

## 2022-06-08 NOTE — DISCHARGE NOTE NURSING/CASE MANAGEMENT/SOCIAL WORK - PATIENT PORTAL LINK FT
You can access the FollowMyHealth Patient Portal offered by [facility] by registering at the following website: [email or web address]. By joining [employer]’s FollowMyHealth portal, you will also be able to view your health information using other applications (apps) compatible with our system.

## 2022-06-20 ENCOUNTER — APPOINTMENT (OUTPATIENT)
Dept: ORTHOPEDIC SURGERY | Facility: CLINIC | Age: 82
End: 2022-06-20
Payer: MEDICARE

## 2022-06-20 VITALS — DIASTOLIC BLOOD PRESSURE: 74 MMHG | SYSTOLIC BLOOD PRESSURE: 187 MMHG | HEART RATE: 59 BPM

## 2022-06-20 PROCEDURE — 99024 POSTOP FOLLOW-UP VISIT: CPT

## 2022-06-20 PROCEDURE — 72050 X-RAY EXAM NECK SPINE 4/5VWS: CPT

## 2022-06-20 RX ORDER — FLUTICASONE FUROATE AND VILANTEROL TRIFENATATE 100; 25 UG/1; UG/1
100-25 POWDER RESPIRATORY (INHALATION)
Qty: 60 | Refills: 0 | Status: ACTIVE | COMMUNITY
Start: 2021-12-30

## 2022-06-20 RX ORDER — RIVASTIGMINE TARTRATE 1.5 MG/1
1.5 CAPSULE ORAL
Qty: 60 | Refills: 0 | Status: ACTIVE | COMMUNITY
Start: 2022-04-15

## 2022-06-20 RX ORDER — ALBUTEROL SULFATE 90 UG/1
108 (90 BASE) INHALANT RESPIRATORY (INHALATION)
Qty: 8 | Refills: 0 | Status: ACTIVE | COMMUNITY
Start: 2022-05-26

## 2022-06-20 RX ORDER — HYDROMORPHONE HYDROCHLORIDE 2 MG/1
2 TABLET ORAL
Qty: 42 | Refills: 0 | Status: ACTIVE | COMMUNITY
Start: 2022-06-08

## 2022-07-18 ENCOUNTER — APPOINTMENT (OUTPATIENT)
Dept: ORTHOPEDIC SURGERY | Facility: CLINIC | Age: 82
End: 2022-07-18

## 2022-07-18 VITALS — SYSTOLIC BLOOD PRESSURE: 147 MMHG | DIASTOLIC BLOOD PRESSURE: 71 MMHG | HEART RATE: 80 BPM

## 2022-07-18 PROCEDURE — 99024 POSTOP FOLLOW-UP VISIT: CPT

## 2022-07-18 PROCEDURE — 72050 X-RAY EXAM NECK SPINE 4/5VWS: CPT

## 2022-07-18 NOTE — HISTORY OF PRESENT ILLNESS
[7] : the patient reports pain that is 7/10 in severity [Slow Progress] : is progressing slowly [___ Weeks Post Op] : [unfilled] weeks post op [Clean/Dry/Intact] : clean, dry and intact [Healed] : healed [Xray (Date:___)] : [unfilled] Xray -  [Callus Formation] : callus formation [Hardware in Good Position] : hardware in good position [No Obvious Fractures] : no obvious fractures [Good Overall Alignment] : good overall alignment [No Sign of Infection] : is showing no signs of infection [Adequate Pain Control] : has adequate pain control [de-identified] : S/P Laminectomy with fusion C3-6 DOS 6/7/22 [de-identified] : Patient is complaining of weakness and dysesthesias.  She has a known history of myelopathy and is status post decompressive laminectomy and fusion.  Patient is only getting in-home physical therapy once a week.  She has a history of diabetes which is also delaying her recovery. [de-identified] : Patient is neurologically stable. [de-identified] : Patient will begin outpatient physical therapy and follow-up with us in 2 months with follow-up x-rays on return.

## 2022-09-12 ENCOUNTER — APPOINTMENT (OUTPATIENT)
Dept: ORTHOPEDIC SURGERY | Facility: CLINIC | Age: 82
End: 2022-09-12

## 2022-09-12 PROCEDURE — 99213 OFFICE O/P EST LOW 20 MIN: CPT

## 2022-09-12 PROCEDURE — 72050 X-RAY EXAM NECK SPINE 4/5VWS: CPT

## 2022-09-22 NOTE — PHYSICAL THERAPY INITIAL EVALUATION ADULT - ADDITIONAL COMMENTS
Basal and SSI. 40u Levemir at home nightly  Hold Oral hypoglycemics while patient is in the hospital.   Pt lives in private home with  and daughter. Home has 4 SOHAIL with handrail and none inside. Pt was independent prior. Unable to obtain information if pt has RW or not.

## 2022-11-01 NOTE — H&P PST ADULT - CIGARETTE, PACK YRS
PROVIDER:[TOKEN:[[id number]]],PROVIDER:[TOKEN:[[id number]]] 18 PROVIDER:[TOKEN:[[id number]]],PROVIDER:[TOKEN:[[id number]],FOLLOWUP:[2 weeks]]

## 2023-03-06 NOTE — PRE-OP CHECKLIST - WAS PATIENT ON BETA BLOCKER?
No
Performed By: Fabio parnell
Expiration Date (Optional): 4/30/24
Lot # (Optional): [id number]
Urine Pregnancy Test Result: negative
Detail Level: None

## 2023-03-15 ENCOUNTER — APPOINTMENT (OUTPATIENT)
Dept: ORTHOPEDIC SURGERY | Facility: CLINIC | Age: 83
End: 2023-03-15
Payer: MEDICARE

## 2023-03-15 VITALS — DIASTOLIC BLOOD PRESSURE: 78 MMHG | SYSTOLIC BLOOD PRESSURE: 158 MMHG | HEART RATE: 68 BPM

## 2023-03-15 PROCEDURE — 72050 X-RAY EXAM NECK SPINE 4/5VWS: CPT

## 2023-03-15 PROCEDURE — 99214 OFFICE O/P EST MOD 30 MIN: CPT

## 2023-03-15 NOTE — HISTORY OF PRESENT ILLNESS
[Intermit.] : ~He/She~ states the symptoms seem to be intermittent [de-identified] : Patient presents a follow-up visit for s/p laminectomy & fusion at C3-C6 6/7/22. She reports that she feels as if her symptoms have worsened since her last visit with us. She states she has stiffness at times and noted numbness to her hand bilaterally. SHe denies any headaches, dizziness, or current physical therapy.\par

## 2023-03-15 NOTE — DISCUSSION/SUMMARY
[2 Weeks] : in 2 weeks [de-identified] : I expressed to the patient and daughter that based on her X-Rays, the hardware in her neck has not shifted and appears stable. I advised that we can obtain an MRI to evaluate any adjacent level spinal stenosis causing her current symptoms. I provided the script to obtain an MRI to the patient and the patient will follow-up in 1-2 weeks to review the results of the MRI.  The patient appears depressed and is referred to her primary care physician for further evaluation and treatment.

## 2023-03-15 NOTE — REASON FOR VISIT
[Follow-Up Visit] : a follow-up visit for [FreeTextEntry2] : s/p laminectomy and fusion of C3-C6 6/7/22.

## 2023-03-15 NOTE — PHYSICAL EXAM
[Antalgic] : antalgic [Wide-Based] : wide-based [Shuffling] : shuffling [Motor Strength Upper Extremities] : bilaterally weak [Motor Strength Lower Extremities] : bilaterally weak [] : Sensory: [C5-Bilat] : C5 [C6-Bilat] : C6 [C7-Bilat] : C7 [T1-Bilat] : T1 [L3-Bilat] : L3 [L4-Bilat] : L4 [L5-Bilat] : L5 [S1-Bilat] : S1 [Normal RUE] : Right Upper Extremity: No scars, rashes, lesions, ulcers, skin intact [Normal LUE] : Left Upper Extremity: No scars, rashes, lesions, ulcers, skin intact [Normal] : Oriented to person, place, and time, insight and judgement were intact and the affect was normal [Obese] : not obese [de-identified] : AP lateral and flexion-extension x-rays obtained of the cervical spine show well-placed implants without signs of loosening or periprosthetic fracture.  There is some advancing degeneration at the C6-7 T1 level.\par

## 2023-03-15 NOTE — ADDENDUM
[FreeTextEntry1] : I, Fred Bean Jr, documented this note as a scribe on the behalf of Dr. Chicho Mo MD.

## 2023-03-27 ENCOUNTER — NON-APPOINTMENT (OUTPATIENT)
Age: 83
End: 2023-03-27

## 2023-03-27 ENCOUNTER — APPOINTMENT (OUTPATIENT)
Dept: ORTHOPEDIC SURGERY | Facility: CLINIC | Age: 83
End: 2023-03-27
Payer: MEDICARE

## 2023-03-27 PROCEDURE — 99447 NTRPROF PH1/NTRNET/EHR 11-20: CPT

## 2023-03-27 RX ORDER — RIVASTIGMINE TARTRATE 4.5 MG/1
4.5 CAPSULE ORAL
Qty: 60 | Refills: 0 | Status: ACTIVE | COMMUNITY
Start: 2023-03-13

## 2023-03-27 RX ORDER — SEMAGLUTIDE 1.34 MG/ML
2 INJECTION, SOLUTION SUBCUTANEOUS
Qty: 2 | Refills: 0 | Status: ACTIVE | COMMUNITY
Start: 2023-03-15

## 2023-06-23 ENCOUNTER — APPOINTMENT (OUTPATIENT)
Dept: CARDIOLOGY | Facility: CLINIC | Age: 83
End: 2023-06-23

## 2023-09-14 ENCOUNTER — NON-APPOINTMENT (OUTPATIENT)
Age: 83
End: 2023-09-14

## 2023-09-14 ENCOUNTER — APPOINTMENT (OUTPATIENT)
Dept: CARDIOLOGY | Facility: CLINIC | Age: 83
End: 2023-09-14
Payer: MEDICARE

## 2023-09-14 VITALS
BODY MASS INDEX: 27.56 KG/M2 | HEART RATE: 67 BPM | OXYGEN SATURATION: 95 % | SYSTOLIC BLOOD PRESSURE: 137 MMHG | DIASTOLIC BLOOD PRESSURE: 73 MMHG | HEIGHT: 61 IN | WEIGHT: 146 LBS

## 2023-09-14 DIAGNOSIS — R06.02 SHORTNESS OF BREATH: ICD-10-CM

## 2023-09-14 DIAGNOSIS — R42 DIZZINESS AND GIDDINESS: ICD-10-CM

## 2023-09-14 DIAGNOSIS — F03.90 UNSPECIFIED DEMENTIA W/OUT BEHAVIORAL DISTURBANCE: ICD-10-CM

## 2023-09-14 PROCEDURE — 93000 ELECTROCARDIOGRAM COMPLETE: CPT

## 2023-09-14 PROCEDURE — 99204 OFFICE O/P NEW MOD 45 MIN: CPT

## 2023-09-25 ENCOUNTER — APPOINTMENT (OUTPATIENT)
Dept: CARDIOLOGY | Facility: CLINIC | Age: 83
End: 2023-09-25
Payer: MEDICARE

## 2023-09-25 PROCEDURE — 93306 TTE W/DOPPLER COMPLETE: CPT

## 2024-02-26 ENCOUNTER — APPOINTMENT (OUTPATIENT)
Dept: NUCLEAR MEDICINE | Facility: CLINIC | Age: 84
End: 2024-02-26
Payer: MEDICARE

## 2024-02-26 PROCEDURE — 78814 PET IMAGE W/CT LMTD: CPT

## 2024-04-17 ENCOUNTER — APPOINTMENT (OUTPATIENT)
Dept: ORTHOPEDIC SURGERY | Facility: CLINIC | Age: 84
End: 2024-04-17
Payer: MEDICARE

## 2024-04-17 VITALS — SYSTOLIC BLOOD PRESSURE: 159 MMHG | DIASTOLIC BLOOD PRESSURE: 76 MMHG | HEART RATE: 69 BPM

## 2024-04-17 DIAGNOSIS — Z98.1 ARTHRODESIS STATUS: ICD-10-CM

## 2024-04-17 DIAGNOSIS — G95.9 DISEASE OF SPINAL CORD, UNSPECIFIED: ICD-10-CM

## 2024-04-17 DIAGNOSIS — M54.12 DISEASE OF SPINAL CORD, UNSPECIFIED: ICD-10-CM

## 2024-04-17 PROCEDURE — 99214 OFFICE O/P EST MOD 30 MIN: CPT

## 2024-04-17 PROCEDURE — 72050 X-RAY EXAM NECK SPINE 4/5VWS: CPT

## 2024-04-17 NOTE — ADDENDUM
[FreeTextEntry1] : I, Fred Bean Jr, acted solely as a scribe for Dr. Chicho Mo on this date 04/17/2024 .  All medical record entries made by the Scribe were at my, Dr. Chicho Mo, direction and personally dictated by me on 04/17/2024 . I have reviewed the chart and agree that the record accurately reflects my personal performance of the history, physical exam, assessment and plan. I have also personally directed, reviewed, and agreed with the chart.

## 2024-04-17 NOTE — PHYSICAL EXAM
[Antalgic] : antalgic [Wide-Based] : wide-based [Shuffling] : shuffling [Motor Strength Upper Extremities] : bilaterally weak [] : Sensory: [Motor Strength Lower Extremities] : bilaterally weak [C5-Bilat] : C5 [C6-Bilat] : C6 [C7-Bilat] : C7 [T1-Bilat] : T1 [L3-Bilat] : L3 [L4-Bilat] : L4 [L5-Bilat] : L5 [S1-Bilat] : S1 [Normal RUE] : Right Upper Extremity: No scars, rashes, lesions, ulcers, skin intact [Normal LUE] : Left Upper Extremity: No scars, rashes, lesions, ulcers, skin intact [Normal] : Oriented to person, place, and time, insight and judgement were intact and the affect was normal [Obese] : not obese [de-identified] : AP & Lateral Flexion Extension X-Rays of the Cervical Spine performed on 04/17/2024 shows Solid fusion, no signs of hardware failure. Compared to X-Ray taken in March 2023, no changes.   MRI Evaluation of the cervical spine performed on 03/23/2023 shows Postoperative and Degenerative Changes of the cervical spine.  Abnormal left vertebral flow void of indeterminate chronicity.  Personal review shows no change in the area of myelomalacia compared to the preoperative study.

## 2024-04-17 NOTE — DISCUSSION/SUMMARY
[PRN] : PRN [de-identified] : I discussed the underlying pathophysiology of the patient's condition & X-ray findings. I explained to the patient that her stiffness is due to her prior fusion and there are no significant structural changes to her neck at this time. The patient was advised engage in regular exercise and movement for her neck. The patient and I discussed her options regarding treatment. I highly recommended that the patient starts a course of physical therapy at this time. I provided the patient with a detailed prescription for physical therapy. The patient should follow-up with me as needed for further assessment of her symptoms.  The patient also was made aware as well as her daughter that her medical comorbidities are also getting in the way of neurologic recovery and that she is a labile diabetic.

## 2024-04-17 NOTE — REASON FOR VISIT
[Follow-Up Visit] : a follow-up visit for [FreeTextEntry2] : Cervical myelopathy with cervical radiculopathy

## 2024-04-17 NOTE — HISTORY OF PRESENT ILLNESS
[9] : a current pain level of 9/10 [Intermit.] : ~He/She~ states the symptoms seem to be intermittent [de-identified] : Patient presents a follow-up visit for Cervical myelopathy with cervical radiculopathy. Patient has a Hx of a Cervical fusion consisting of decompressive laminectomy and posterior fusion. The patient states that she continues to have intermittent symptoms pertaining to her neck and upper extremities at this time. She states that recently, she's been having intermittent significant swelling of her hands and feels as if she has no strength. She feels as if she weak and is unable to turn her head when she does to sleep at night. The patient states that she has symptoms of numbness & tingling to her upper extremities. The patient currently takes Tylenol to aid her current symptoms.

## 2024-06-06 NOTE — H&P PST ADULT - BSA (M2)
You received IV Tylenol in OR today.   OK to take Tylenol/Acetaminophen at / after 5:15PM______ for pain and every 6 hours after as needed. OK to take Motrin/Ibuprofen at _ anytime start____ for pain and every 6 hours after as needed. Take pain medicines alternate. 1.59

## 2025-01-15 ENCOUNTER — APPOINTMENT (OUTPATIENT)
Dept: ORTHOPEDIC SURGERY | Facility: CLINIC | Age: 85
End: 2025-01-15
Payer: MEDICARE

## 2025-01-15 VITALS — BODY MASS INDEX: 27.56 KG/M2 | HEIGHT: 61 IN | WEIGHT: 146 LBS

## 2025-01-15 DIAGNOSIS — G95.9 DISEASE OF SPINAL CORD, UNSPECIFIED: ICD-10-CM

## 2025-01-15 DIAGNOSIS — M54.12 DISEASE OF SPINAL CORD, UNSPECIFIED: ICD-10-CM

## 2025-01-15 DIAGNOSIS — Z98.1 ARTHRODESIS STATUS: ICD-10-CM

## 2025-01-15 PROCEDURE — 72070 X-RAY EXAM THORAC SPINE 2VWS: CPT

## 2025-01-15 PROCEDURE — 72100 X-RAY EXAM L-S SPINE 2/3 VWS: CPT

## 2025-01-15 PROCEDURE — 99214 OFFICE O/P EST MOD 30 MIN: CPT

## 2025-01-15 PROCEDURE — 72040 X-RAY EXAM NECK SPINE 2-3 VW: CPT

## 2025-04-22 ENCOUNTER — APPOINTMENT (OUTPATIENT)
Dept: OTOLARYNGOLOGY | Facility: CLINIC | Age: 85
End: 2025-04-22
Payer: MEDICARE

## 2025-04-22 ENCOUNTER — NON-APPOINTMENT (OUTPATIENT)
Age: 85
End: 2025-04-22

## 2025-04-22 VITALS — HEIGHT: 61 IN | WEIGHT: 146 LBS | BODY MASS INDEX: 27.56 KG/M2

## 2025-04-22 DIAGNOSIS — J32.0 CHRONIC MAXILLARY SINUSITIS: ICD-10-CM

## 2025-04-22 DIAGNOSIS — H90.6 MIXED CONDUCTIVE AND SENSORINEURAL HEARING LOSS, BILATERAL: ICD-10-CM

## 2025-04-22 DIAGNOSIS — H65.03 ACUTE SEROUS OTITIS MEDIA, BILATERAL: ICD-10-CM

## 2025-04-22 PROCEDURE — 31231 NASAL ENDOSCOPY DX: CPT

## 2025-04-22 PROCEDURE — 99204 OFFICE O/P NEW MOD 45 MIN: CPT | Mod: 25

## 2025-04-22 PROCEDURE — 92567 TYMPANOMETRY: CPT

## 2025-04-22 PROCEDURE — 92557 COMPREHENSIVE HEARING TEST: CPT

## 2025-04-22 RX ORDER — FLUTICASONE PROPIONATE 50 UG/1
50 SPRAY, METERED NASAL DAILY
Qty: 1 | Refills: 1 | Status: ACTIVE | COMMUNITY
Start: 2025-04-22 | End: 1900-01-01

## 2025-04-22 RX ORDER — AMOXICILLIN AND CLAVULANATE POTASSIUM 875; 125 MG/1; MG/1
875-125 TABLET, COATED ORAL
Qty: 20 | Refills: 0 | Status: ACTIVE | COMMUNITY
Start: 2025-04-22 | End: 1900-01-01

## 2025-05-12 ENCOUNTER — APPOINTMENT (OUTPATIENT)
Dept: OTOLARYNGOLOGY | Facility: CLINIC | Age: 85
End: 2025-05-12
Payer: MEDICARE

## 2025-05-12 VITALS — WEIGHT: 146 LBS | HEIGHT: 61 IN | BODY MASS INDEX: 27.56 KG/M2

## 2025-05-12 DIAGNOSIS — H74.03 TYMPANOSCLEROSIS, BILATERAL: ICD-10-CM

## 2025-05-12 DIAGNOSIS — H90.3 SENSORINEURAL HEARING LOSS, BILATERAL: ICD-10-CM

## 2025-05-12 DIAGNOSIS — H65.03 ACUTE SEROUS OTITIS MEDIA, BILATERAL: ICD-10-CM

## 2025-05-12 DIAGNOSIS — H90.6 MIXED CONDUCTIVE AND SENSORINEURAL HEARING LOSS, BILATERAL: ICD-10-CM

## 2025-05-12 PROCEDURE — 99214 OFFICE O/P EST MOD 30 MIN: CPT

## 2025-05-12 PROCEDURE — 92553 AUDIOMETRY AIR & BONE: CPT

## 2025-05-12 PROCEDURE — 92567 TYMPANOMETRY: CPT

## (undated) DEVICE — WRAP COMPRESSION CALF LG

## (undated) DEVICE — DRAPE C ARM UNIVERSAL

## (undated) DEVICE — GLV 7.5 PROTEXIS

## (undated) DEVICE — ELCTR PENCIL NEPTUNE SMOKE EVACUATION

## (undated) DEVICE — DRAPE MAYO STAND 30"

## (undated) DEVICE — CERVICAL COLLAR ZIMMER PHILA LG

## (undated) DEVICE — FOLEY TRAY 14FR 5CC LF BAG CLOSED

## (undated) DEVICE — SUT SOFSILK 2-0 18" V-20

## (undated) DEVICE — DRAPE MICROSCOPE LEICA  26X150"

## (undated) DEVICE — SUT POLYSORB 1 27" GS-21 UNDYED

## (undated) DEVICE — SOL IRR POUR NS 0.9% 500ML

## (undated) DEVICE — GLV 8 PROTEXIS

## (undated) DEVICE — CERVICAL COLLAR ZIMMER PHILA MED

## (undated) DEVICE — CATH ANGIOCATH 12G

## (undated) DEVICE — SOLIDIFIER CANN EXPRESS 3K

## (undated) DEVICE — DRAIN JACKSON PRATT 10FR ROUND

## (undated) DEVICE — PACK BASIC TIBURON LTXF STRL

## (undated) DEVICE — Device

## (undated) DEVICE — ELCTR EDGE BOVIE INSULATED BLADE TIP 2.75"

## (undated) DEVICE — DRILL 3.5MM

## (undated) DEVICE — BLANKET WARMER LOWER ADULT

## (undated) DEVICE — SOL IRR POUR H2O 1500ML

## (undated) DEVICE — GLV 8.5 PROTEXIS

## (undated) DEVICE — PREP SCRUB IODO DURAPREP 26CC

## (undated) DEVICE — SUT SOFSILK 2-0 30" TIES

## (undated) DEVICE — CANISTER SUCTION LID GUARD 3000CC

## (undated) DEVICE — PACK SPINE

## (undated) DEVICE — ELCTR EDGE BOVIE INSULATED BLADE TIP 4"

## (undated) DEVICE — POSITIONER STRAP ARMBOARD VELCRO TS-30 12

## (undated) DEVICE — SUT POLYSORB 2-0 30" GS-11 UNDYED

## (undated) DEVICE — CONTAINER SPECIMEN 4OZ

## (undated) DEVICE — BUR STRYKER NEURO DRILL 3X3.8MM

## (undated) DEVICE — NDL SPINAL 18G X 3.5"

## (undated) DEVICE — DRAPE COVER TABLE 44X75"